# Patient Record
Sex: FEMALE | Race: WHITE | NOT HISPANIC OR LATINO | Employment: FULL TIME | URBAN - METROPOLITAN AREA
[De-identification: names, ages, dates, MRNs, and addresses within clinical notes are randomized per-mention and may not be internally consistent; named-entity substitution may affect disease eponyms.]

---

## 2017-01-05 ENCOUNTER — ALLSCRIPTS OFFICE VISIT (OUTPATIENT)
Dept: PERINATAL CARE | Facility: CLINIC | Age: 37
End: 2017-01-05
Payer: COMMERCIAL

## 2017-01-05 ENCOUNTER — ALLSCRIPTS OFFICE VISIT (OUTPATIENT)
Dept: OTHER | Facility: OTHER | Age: 37
End: 2017-01-05

## 2017-01-05 ENCOUNTER — GENERIC CONVERSION - ENCOUNTER (OUTPATIENT)
Dept: OTHER | Facility: OTHER | Age: 37
End: 2017-01-05

## 2017-01-05 LAB
GLUCOSE (HISTORICAL): NORMAL
PROT UR STRIP-MCNC: NORMAL MG/DL

## 2017-01-05 PROCEDURE — 76801 OB US < 14 WKS SINGLE FETUS: CPT | Performed by: OBSTETRICS & GYNECOLOGY

## 2017-01-05 PROCEDURE — 76813 OB US NUCHAL MEAS 1 GEST: CPT | Performed by: OBSTETRICS & GYNECOLOGY

## 2017-01-12 ENCOUNTER — GENERIC CONVERSION - ENCOUNTER (OUTPATIENT)
Dept: OTHER | Facility: OTHER | Age: 37
End: 2017-01-12

## 2017-01-18 ENCOUNTER — LAB CONVERSION - ENCOUNTER (OUTPATIENT)
Dept: OTHER | Facility: OTHER | Age: 37
End: 2017-01-18

## 2017-01-18 LAB
ABNORMAL MSS? (HISTORICAL): NORMAL
ABNORMAL US? (HISTORICAL): NORMAL
ADVANCED MATERNAL AGE? (HISTORICAL): YES
CHROMOSOME ANALYSIS (HISTORICAL): NORMAL
CHROMOSOME, BLOOD (HISTORICAL): DETECTED
CLINICAL HISTORY (HISTORICAL): NORMAL
COMMENTS: (HISTORICAL): NORMAL
FETAL FRACTION (HISTORICAL): NORMAL
GESTATIONAL AGE (HISTORICAL): 0
GESTATIONAL AGE (HISTORICAL): 14
INTERPRETATION (HISTORICAL): NORMAL
LIMITATIONS (HISTORICAL): NORMAL
METHODOLOGY (HISTORICAL): NORMAL
MICRODELETION (HISTORICAL): NOT DETECTED
MICRODELETION INTERPR. (HISTORICAL): NORMAL
NUMBER OF FETUSES (HISTORICAL): 1
SPECIFICATIONS (HISTORICAL): NORMAL
TRISOMY 13 (T13) (HISTORICAL): NEGATIVE
TRISOMY 18 (T18) (HISTORICAL): NEGATIVE
TRISOMY 21 (T21) (HISTORICAL): NEGATIVE

## 2017-01-19 ENCOUNTER — GENERIC CONVERSION - ENCOUNTER (OUTPATIENT)
Dept: OTHER | Facility: OTHER | Age: 37
End: 2017-01-19

## 2017-01-30 ENCOUNTER — APPOINTMENT (OUTPATIENT)
Dept: LAB | Age: 37
End: 2017-01-30
Payer: COMMERCIAL

## 2017-01-30 ENCOUNTER — ALLSCRIPTS OFFICE VISIT (OUTPATIENT)
Dept: OTHER | Facility: OTHER | Age: 37
End: 2017-01-30

## 2017-01-30 ENCOUNTER — TRANSCRIBE ORDERS (OUTPATIENT)
Dept: ADMINISTRATIVE | Age: 37
End: 2017-01-30

## 2017-01-30 DIAGNOSIS — Z36.9 ENCOUNTER FOR ANTENATAL SCREENING OF MOTHER: ICD-10-CM

## 2017-01-30 LAB
GLUCOSE (HISTORICAL): NORMAL
PROT UR STRIP-MCNC: NORMAL MG/DL

## 2017-01-30 PROCEDURE — 82105 ALPHA-FETOPROTEIN SERUM: CPT

## 2017-01-30 PROCEDURE — 36415 COLL VENOUS BLD VENIPUNCTURE: CPT

## 2017-02-01 LAB
2ND TRIMESTER 4 SCREEN SERPL-IMP: NORMAL
AFP ADJ MOM SERPL: 0.97
AFP INTERP AMN-IMP: NORMAL
AFP INTERP SERPL-IMP: NORMAL
AFP INTERP SERPL-IMP: NORMAL
AFP SERPL-MCNC: 32.5 NG/ML
AGE AT DELIVERY: 35.2 YEARS
GA METHOD: NORMAL
GA: 16.3 WEEKS
IDDM PATIENT QL: NO
Lab: NORMAL
MULTIPLE PREGNANCY: NO
NEURAL TUBE DEFECT RISK FETUS: NORMAL %

## 2017-02-23 ENCOUNTER — ALLSCRIPTS OFFICE VISIT (OUTPATIENT)
Dept: PERINATAL CARE | Facility: CLINIC | Age: 37
End: 2017-02-23
Payer: COMMERCIAL

## 2017-02-23 ENCOUNTER — GENERIC CONVERSION - ENCOUNTER (OUTPATIENT)
Dept: OTHER | Facility: OTHER | Age: 37
End: 2017-02-23

## 2017-02-23 PROCEDURE — 76811 OB US DETAILED SNGL FETUS: CPT | Performed by: OBSTETRICS & GYNECOLOGY

## 2017-02-23 PROCEDURE — 76817 TRANSVAGINAL US OBSTETRIC: CPT | Performed by: OBSTETRICS & GYNECOLOGY

## 2017-02-28 ENCOUNTER — ALLSCRIPTS OFFICE VISIT (OUTPATIENT)
Dept: OTHER | Facility: OTHER | Age: 37
End: 2017-02-28

## 2017-02-28 ENCOUNTER — GENERIC CONVERSION - ENCOUNTER (OUTPATIENT)
Dept: OTHER | Facility: OTHER | Age: 37
End: 2017-02-28

## 2017-03-31 ENCOUNTER — GENERIC CONVERSION - ENCOUNTER (OUTPATIENT)
Dept: OTHER | Facility: OTHER | Age: 37
End: 2017-03-31

## 2017-03-31 DIAGNOSIS — N39.0 URINARY TRACT INFECTION: ICD-10-CM

## 2017-03-31 DIAGNOSIS — Z36.9 ENCOUNTER FOR ANTENATAL SCREENING OF MOTHER: ICD-10-CM

## 2017-04-25 ENCOUNTER — APPOINTMENT (OUTPATIENT)
Dept: LAB | Age: 37
End: 2017-04-25
Payer: COMMERCIAL

## 2017-04-25 DIAGNOSIS — Z36.9 ENCOUNTER FOR ANTENATAL SCREENING OF MOTHER: ICD-10-CM

## 2017-04-25 LAB
BASOPHILS # BLD AUTO: 0.01 THOUSANDS/ΜL (ref 0–0.1)
BASOPHILS NFR BLD AUTO: 0 % (ref 0–1)
EOSINOPHIL # BLD AUTO: 0.13 THOUSAND/ΜL (ref 0–0.61)
EOSINOPHIL NFR BLD AUTO: 2 % (ref 0–6)
ERYTHROCYTE [DISTWIDTH] IN BLOOD BY AUTOMATED COUNT: 13.6 % (ref 11.6–15.1)
GLUCOSE 1H P 50 G GLC PO SERPL-MCNC: 123 MG/DL
HCT VFR BLD AUTO: 35.3 % (ref 34.8–46.1)
HGB BLD-MCNC: 12.1 G/DL (ref 11.5–15.4)
LYMPHOCYTES # BLD AUTO: 1.44 THOUSANDS/ΜL (ref 0.6–4.47)
LYMPHOCYTES NFR BLD AUTO: 16 % (ref 14–44)
MCH RBC QN AUTO: 30.6 PG (ref 26.8–34.3)
MCHC RBC AUTO-ENTMCNC: 34.3 G/DL (ref 31.4–37.4)
MCV RBC AUTO: 89 FL (ref 82–98)
MONOCYTES # BLD AUTO: 0.5 THOUSAND/ΜL (ref 0.17–1.22)
MONOCYTES NFR BLD AUTO: 6 % (ref 4–12)
NEUTROPHILS # BLD AUTO: 6.78 THOUSANDS/ΜL (ref 1.85–7.62)
NEUTS SEG NFR BLD AUTO: 76 % (ref 43–75)
NRBC BLD AUTO-RTO: 0 /100 WBCS
PLATELET # BLD AUTO: 172 THOUSANDS/UL (ref 149–390)
PMV BLD AUTO: 11.7 FL (ref 8.9–12.7)
RBC # BLD AUTO: 3.96 MILLION/UL (ref 3.81–5.12)
RPR SER QL: NORMAL
WBC # BLD AUTO: 8.92 THOUSAND/UL (ref 4.31–10.16)

## 2017-04-25 PROCEDURE — 36415 COLL VENOUS BLD VENIPUNCTURE: CPT

## 2017-04-25 PROCEDURE — 85025 COMPLETE CBC W/AUTO DIFF WBC: CPT

## 2017-04-25 PROCEDURE — 82950 GLUCOSE TEST: CPT

## 2017-04-25 PROCEDURE — 86592 SYPHILIS TEST NON-TREP QUAL: CPT

## 2017-04-28 ENCOUNTER — ALLSCRIPTS OFFICE VISIT (OUTPATIENT)
Dept: OTHER | Facility: OTHER | Age: 37
End: 2017-04-28

## 2017-04-28 ENCOUNTER — GENERIC CONVERSION - ENCOUNTER (OUTPATIENT)
Dept: OTHER | Facility: OTHER | Age: 37
End: 2017-04-28

## 2017-05-08 ENCOUNTER — TRANSCRIBE ORDERS (OUTPATIENT)
Dept: LAB | Age: 37
End: 2017-05-08

## 2017-05-08 ENCOUNTER — APPOINTMENT (OUTPATIENT)
Dept: LAB | Age: 37
End: 2017-05-08
Payer: COMMERCIAL

## 2017-05-08 DIAGNOSIS — N39.0 URINARY TRACT INFECTION: ICD-10-CM

## 2017-05-08 LAB
BILIRUB UR QL STRIP: NEGATIVE
CLARITY UR: CLEAR
COLOR UR: YELLOW
GLUCOSE UR STRIP-MCNC: NEGATIVE MG/DL
HGB UR QL STRIP.AUTO: NEGATIVE
KETONES UR STRIP-MCNC: NEGATIVE MG/DL
LEUKOCYTE ESTERASE UR QL STRIP: NEGATIVE
NITRITE UR QL STRIP: NEGATIVE
PH UR STRIP.AUTO: 6.5 [PH] (ref 4.5–8)
PROT UR STRIP-MCNC: NEGATIVE MG/DL
SP GR UR STRIP.AUTO: 1.02 (ref 1–1.03)
UROBILINOGEN UR QL STRIP.AUTO: 0.2 E.U./DL

## 2017-05-08 PROCEDURE — 81003 URINALYSIS AUTO W/O SCOPE: CPT

## 2017-05-12 ENCOUNTER — GENERIC CONVERSION - ENCOUNTER (OUTPATIENT)
Dept: OTHER | Facility: OTHER | Age: 37
End: 2017-05-12

## 2017-05-18 ENCOUNTER — GENERIC CONVERSION - ENCOUNTER (OUTPATIENT)
Dept: OTHER | Facility: OTHER | Age: 37
End: 2017-05-18

## 2017-05-18 ENCOUNTER — ALLSCRIPTS OFFICE VISIT (OUTPATIENT)
Dept: PERINATAL CARE | Facility: CLINIC | Age: 37
End: 2017-05-18
Payer: COMMERCIAL

## 2017-05-18 PROCEDURE — 76816 OB US FOLLOW-UP PER FETUS: CPT | Performed by: OBSTETRICS & GYNECOLOGY

## 2017-05-25 ENCOUNTER — GENERIC CONVERSION - ENCOUNTER (OUTPATIENT)
Dept: OTHER | Facility: OTHER | Age: 37
End: 2017-05-25

## 2017-06-08 ENCOUNTER — GENERIC CONVERSION - ENCOUNTER (OUTPATIENT)
Dept: OTHER | Facility: OTHER | Age: 37
End: 2017-06-08

## 2017-06-08 LAB — EXTERNAL GROUP B STREP ANTIGEN: NEGATIVE

## 2017-06-20 ENCOUNTER — GENERIC CONVERSION - ENCOUNTER (OUTPATIENT)
Dept: OTHER | Facility: OTHER | Age: 37
End: 2017-06-20

## 2017-06-29 ENCOUNTER — GENERIC CONVERSION - ENCOUNTER (OUTPATIENT)
Dept: OTHER | Facility: OTHER | Age: 37
End: 2017-06-29

## 2017-07-07 ENCOUNTER — GENERIC CONVERSION - ENCOUNTER (OUTPATIENT)
Dept: OTHER | Facility: OTHER | Age: 37
End: 2017-07-07

## 2017-07-13 ENCOUNTER — GENERIC CONVERSION - ENCOUNTER (OUTPATIENT)
Dept: OTHER | Facility: OTHER | Age: 37
End: 2017-07-13

## 2017-07-14 ENCOUNTER — APPOINTMENT (OUTPATIENT)
Dept: PERINATAL CARE | Facility: CLINIC | Age: 37
End: 2017-07-14
Payer: COMMERCIAL

## 2017-07-14 ENCOUNTER — ALLSCRIPTS OFFICE VISIT (OUTPATIENT)
Dept: PERINATAL CARE | Facility: CLINIC | Age: 37
End: 2017-07-14
Payer: COMMERCIAL

## 2017-07-14 ENCOUNTER — GENERIC CONVERSION - ENCOUNTER (OUTPATIENT)
Dept: OTHER | Facility: OTHER | Age: 37
End: 2017-07-14

## 2017-07-14 PROCEDURE — 76816 OB US FOLLOW-UP PER FETUS: CPT | Performed by: OBSTETRICS & GYNECOLOGY

## 2017-07-14 PROCEDURE — 59025 FETAL NON-STRESS TEST: CPT | Performed by: OBSTETRICS & GYNECOLOGY

## 2017-07-15 ENCOUNTER — ANESTHESIA EVENT (INPATIENT)
Dept: LABOR AND DELIVERY | Facility: HOSPITAL | Age: 37
End: 2017-07-15
Payer: COMMERCIAL

## 2017-07-15 ENCOUNTER — HOSPITAL ENCOUNTER (OUTPATIENT)
Facility: HOSPITAL | Age: 37
Discharge: HOME | End: 2017-07-15
Attending: OBSTETRICS & GYNECOLOGY | Admitting: OBSTETRICS & GYNECOLOGY
Payer: COMMERCIAL

## 2017-07-15 ENCOUNTER — ANESTHESIA (INPATIENT)
Dept: LABOR AND DELIVERY | Facility: HOSPITAL | Age: 37
End: 2017-07-15
Payer: COMMERCIAL

## 2017-07-15 ENCOUNTER — HOSPITAL ENCOUNTER (INPATIENT)
Facility: HOSPITAL | Age: 37
LOS: 3 days | Discharge: HOME | End: 2017-07-18
Attending: OBSTETRICS & GYNECOLOGY | Admitting: OBSTETRICS & GYNECOLOGY
Payer: COMMERCIAL

## 2017-07-15 VITALS
HEIGHT: 64 IN | WEIGHT: 185 LBS | OXYGEN SATURATION: 96 % | BODY MASS INDEX: 31.58 KG/M2 | RESPIRATION RATE: 18 BRPM | TEMPERATURE: 97.6 F | SYSTOLIC BLOOD PRESSURE: 124 MMHG | HEART RATE: 110 BPM | DIASTOLIC BLOOD PRESSURE: 82 MMHG

## 2017-07-15 DIAGNOSIS — Z98.891 S/P CESAREAN SECTION: ICD-10-CM

## 2017-07-15 DIAGNOSIS — Z3A.40 40 WEEKS GESTATION OF PREGNANCY: Primary | ICD-10-CM

## 2017-07-15 LAB
ABO GROUP BLD: NORMAL
BASE EXCESS BLDCOA CALC-SCNC: -0.2 MMOL/L (ref 3–11)
BASE EXCESS BLDCOV CALC-SCNC: 0.5 MMOL/L (ref 1–9)
BASOPHILS # BLD AUTO: 0.02 THOUSANDS/ΜL (ref 0–0.1)
BASOPHILS NFR BLD AUTO: 0 % (ref 0–1)
BLD GP AB SCN SERPL QL: NEGATIVE
EOSINOPHIL # BLD AUTO: 0.03 THOUSAND/ΜL (ref 0–0.61)
EOSINOPHIL NFR BLD AUTO: 0 % (ref 0–6)
ERYTHROCYTE [DISTWIDTH] IN BLOOD BY AUTOMATED COUNT: 13.7 % (ref 11.6–15.1)
HCO3 BLDCOA-SCNC: 25.7 MMOL/L (ref 17.3–27.3)
HCO3 BLDCOV-SCNC: 25.6 MMOL/L (ref 12.2–28.6)
HCT VFR BLD AUTO: 38.2 % (ref 34.8–46.1)
HGB BLD-MCNC: 13 G/DL (ref 11.5–15.4)
LYMPHOCYTES # BLD AUTO: 1.62 THOUSANDS/ΜL (ref 0.6–4.47)
LYMPHOCYTES NFR BLD AUTO: 9 % (ref 14–44)
MCH RBC QN AUTO: 29.8 PG (ref 26.8–34.3)
MCHC RBC AUTO-ENTMCNC: 34 G/DL (ref 31.4–37.4)
MCV RBC AUTO: 88 FL (ref 82–98)
MONOCYTES # BLD AUTO: 0.83 THOUSAND/ΜL (ref 0.17–1.22)
MONOCYTES NFR BLD AUTO: 4 % (ref 4–12)
NEUTROPHILS # BLD AUTO: 16.05 THOUSANDS/ΜL (ref 1.85–7.62)
NEUTS SEG NFR BLD AUTO: 87 % (ref 43–75)
NRBC BLD AUTO-RTO: 0 /100 WBCS
O2 CT VFR BLDCOA CALC: 3.6 ML/DL
OXYHGB MFR BLDCOA: 16.6 %
OXYHGB MFR BLDCOV: 45.4 %
PCO2 BLDCOA: 46.3 MM[HG] (ref 30–60)
PCO2 BLDCOV: 42.5 MM HG (ref 27–43)
PH BLDCOA: 7.36 [PH] (ref 7.23–7.43)
PH BLDCOV: 7.4 [PH] (ref 7.19–7.49)
PLATELET # BLD AUTO: 165 THOUSANDS/UL (ref 149–390)
PMV BLD AUTO: 12.8 FL (ref 8.9–12.7)
PO2 BLDCOA: 12.9 MM HG (ref 5–25)
PO2 BLDCOV: 21.8 MM HG (ref 15–45)
RBC # BLD AUTO: 4.36 MILLION/UL (ref 3.81–5.12)
RH BLD: POSITIVE
SAO2 % BLDCOV: 10.3 ML/DL
SPECIMEN EXPIRATION DATE: NORMAL
WBC # BLD AUTO: 18.68 THOUSAND/UL (ref 4.31–10.16)

## 2017-07-15 PROCEDURE — 99215 OFFICE O/P EST HI 40 MIN: CPT

## 2017-07-15 PROCEDURE — 86901 BLOOD TYPING SEROLOGIC RH(D): CPT | Performed by: OBSTETRICS & GYNECOLOGY

## 2017-07-15 PROCEDURE — 3E033VJ INTRODUCTION OF OTHER HORMONE INTO PERIPHERAL VEIN, PERCUTANEOUS APPROACH: ICD-10-PCS | Performed by: OBSTETRICS & GYNECOLOGY

## 2017-07-15 PROCEDURE — 85025 COMPLETE CBC W/AUTO DIFF WBC: CPT | Performed by: OBSTETRICS & GYNECOLOGY

## 2017-07-15 PROCEDURE — 86850 RBC ANTIBODY SCREEN: CPT | Performed by: OBSTETRICS & GYNECOLOGY

## 2017-07-15 PROCEDURE — 4A1HXCZ MONITORING OF PRODUCTS OF CONCEPTION, CARDIAC RATE, EXTERNAL APPROACH: ICD-10-PCS | Performed by: OBSTETRICS & GYNECOLOGY

## 2017-07-15 PROCEDURE — 86900 BLOOD TYPING SEROLOGIC ABO: CPT | Performed by: OBSTETRICS & GYNECOLOGY

## 2017-07-15 PROCEDURE — 82805 BLOOD GASES W/O2 SATURATION: CPT | Performed by: OBSTETRICS & GYNECOLOGY

## 2017-07-15 PROCEDURE — 86592 SYPHILIS TEST NON-TREP QUAL: CPT | Performed by: OBSTETRICS & GYNECOLOGY

## 2017-07-15 PROCEDURE — 10907ZC DRAINAGE OF AMNIOTIC FLUID, THERAPEUTIC FROM PRODUCTS OF CONCEPTION, VIA NATURAL OR ARTIFICIAL OPENING: ICD-10-PCS | Performed by: OBSTETRICS & GYNECOLOGY

## 2017-07-15 PROCEDURE — 99213 OFFICE O/P EST LOW 20 MIN: CPT

## 2017-07-15 RX ORDER — ROPIVACAINE HYDROCHLORIDE 2 MG/ML
INJECTION, SOLUTION EPIDURAL; INFILTRATION; PERINEURAL AS NEEDED
Status: DISCONTINUED | OUTPATIENT
Start: 2017-07-15 | End: 2017-07-15 | Stop reason: SURG

## 2017-07-15 RX ORDER — SODIUM CHLORIDE, SODIUM LACTATE, POTASSIUM CHLORIDE, CALCIUM CHLORIDE 600; 310; 30; 20 MG/100ML; MG/100ML; MG/100ML; MG/100ML
INJECTION, SOLUTION INTRAVENOUS CONTINUOUS PRN
Status: DISCONTINUED | OUTPATIENT
Start: 2017-07-15 | End: 2017-07-15 | Stop reason: SURG

## 2017-07-15 RX ORDER — ONDANSETRON 2 MG/ML
4 INJECTION INTRAMUSCULAR; INTRAVENOUS EVERY 6 HOURS PRN
Status: DISCONTINUED | OUTPATIENT
Start: 2017-07-15 | End: 2017-07-18 | Stop reason: HOSPADM

## 2017-07-15 RX ORDER — CEFAZOLIN SODIUM 1 G/3ML
INJECTION, POWDER, FOR SOLUTION INTRAMUSCULAR; INTRAVENOUS AS NEEDED
Status: DISCONTINUED | OUTPATIENT
Start: 2017-07-15 | End: 2017-07-15 | Stop reason: SURG

## 2017-07-15 RX ORDER — ONDANSETRON 2 MG/ML
INJECTION INTRAMUSCULAR; INTRAVENOUS AS NEEDED
Status: DISCONTINUED | OUTPATIENT
Start: 2017-07-15 | End: 2017-07-15 | Stop reason: SURG

## 2017-07-15 RX ORDER — FENTANYL CITRATE/PF 50 MCG/ML
25 SYRINGE (ML) INJECTION
Status: COMPLETED | OUTPATIENT
Start: 2017-07-15 | End: 2017-07-15

## 2017-07-15 RX ORDER — MORPHINE SULFATE 2 MG/ML
2 INJECTION, SOLUTION INTRAMUSCULAR; INTRAVENOUS
Status: DISCONTINUED | OUTPATIENT
Start: 2017-07-15 | End: 2017-07-17

## 2017-07-15 RX ORDER — LIDOCAINE HYDROCHLORIDE AND EPINEPHRINE 20; 5 MG/ML; UG/ML
INJECTION, SOLUTION EPIDURAL; INFILTRATION; INTRACAUDAL; PERINEURAL AS NEEDED
Status: DISCONTINUED | OUTPATIENT
Start: 2017-07-15 | End: 2017-07-15 | Stop reason: SURG

## 2017-07-15 RX ORDER — MORPHINE SULFATE 1 MG/ML
INJECTION, SOLUTION EPIDURAL; INTRATHECAL; INTRAVENOUS AS NEEDED
Status: DISCONTINUED | OUTPATIENT
Start: 2017-07-15 | End: 2017-07-15 | Stop reason: SURG

## 2017-07-15 RX ORDER — SODIUM CHLORIDE, SODIUM LACTATE, POTASSIUM CHLORIDE, CALCIUM CHLORIDE 600; 310; 30; 20 MG/100ML; MG/100ML; MG/100ML; MG/100ML
125 INJECTION, SOLUTION INTRAVENOUS CONTINUOUS
Status: DISCONTINUED | OUTPATIENT
Start: 2017-07-15 | End: 2017-07-16

## 2017-07-15 RX ORDER — OXYTOCIN/RINGER'S LACTATE 30/500 ML
1-30 PLASTIC BAG, INJECTION (ML) INTRAVENOUS
Status: DISCONTINUED | OUTPATIENT
Start: 2017-07-15 | End: 2017-07-17

## 2017-07-15 RX ORDER — ONDANSETRON 2 MG/ML
4 INJECTION INTRAMUSCULAR; INTRAVENOUS ONCE AS NEEDED
Status: DISCONTINUED | OUTPATIENT
Start: 2017-07-15 | End: 2017-07-18 | Stop reason: HOSPADM

## 2017-07-15 RX ADMIN — SODIUM CHLORIDE, SODIUM LACTATE, POTASSIUM CHLORIDE, AND CALCIUM CHLORIDE 125 ML/HR: .6; .31; .03; .02 INJECTION, SOLUTION INTRAVENOUS at 22:42

## 2017-07-15 RX ADMIN — SODIUM CHLORIDE, SODIUM LACTATE, POTASSIUM CHLORIDE, AND CALCIUM CHLORIDE 125 ML/HR: .6; .31; .03; .02 INJECTION, SOLUTION INTRAVENOUS at 15:20

## 2017-07-15 RX ADMIN — LIDOCAINE HYDROCHLORIDE AND EPINEPHRINE 5 ML: 20; 5 INJECTION, SOLUTION EPIDURAL; INFILTRATION; INTRACAUDAL; PERINEURAL at 21:02

## 2017-07-15 RX ADMIN — PHENYLEPHRINE HYDROCHLORIDE 50 MCG/MIN: 10 INJECTION INTRAVENOUS at 21:05

## 2017-07-15 RX ADMIN — AZITHROMYCIN MONOHYDRATE 500 MG: 500 INJECTION, POWDER, LYOPHILIZED, FOR SOLUTION INTRAVENOUS at 21:31

## 2017-07-15 RX ADMIN — ONDANSETRON 4 MG: 2 INJECTION INTRAMUSCULAR; INTRAVENOUS at 21:25

## 2017-07-15 RX ADMIN — LIDOCAINE HYDROCHLORIDE AND EPINEPHRINE 5 ML: 20; 5 INJECTION, SOLUTION EPIDURAL; INFILTRATION; INTRACAUDAL; PERINEURAL at 20:57

## 2017-07-15 RX ADMIN — DEXAMETHASONE SODIUM PHOSPHATE 10 MG: 10 INJECTION INTRAMUSCULAR; INTRAVENOUS at 21:47

## 2017-07-15 RX ADMIN — CEFAZOLIN 2000 MG: 1 INJECTION, POWDER, FOR SOLUTION INTRAVENOUS at 21:01

## 2017-07-15 RX ADMIN — SODIUM CHLORIDE, SODIUM LACTATE, POTASSIUM CHLORIDE, AND CALCIUM CHLORIDE: .6; .31; .03; .02 INJECTION, SOLUTION INTRAVENOUS at 20:42

## 2017-07-15 RX ADMIN — OXYTOCIN 65 MILLI-UNITS/MIN: 10 INJECTION, SOLUTION INTRAMUSCULAR; INTRAVENOUS at 21:15

## 2017-07-15 RX ADMIN — FENTANYL CITRATE 25 MCG: 50 INJECTION INTRAMUSCULAR; INTRAVENOUS at 22:27

## 2017-07-15 RX ADMIN — FENTANYL CITRATE 25 MCG: 50 INJECTION INTRAMUSCULAR; INTRAVENOUS at 23:20

## 2017-07-15 RX ADMIN — FENTANYL CITRATE 25 MCG: 50 INJECTION INTRAMUSCULAR; INTRAVENOUS at 22:35

## 2017-07-15 RX ADMIN — FENTANYL CITRATE 25 MCG: 50 INJECTION INTRAMUSCULAR; INTRAVENOUS at 23:37

## 2017-07-15 RX ADMIN — ROPIVACAINE HYDROCHLORIDE 5 ML: 2 INJECTION, SOLUTION EPIDURAL; INFILTRATION at 16:42

## 2017-07-15 RX ADMIN — SODIUM CHLORIDE, SODIUM LACTATE, POTASSIUM CHLORIDE, AND CALCIUM CHLORIDE 125 ML/HR: .6; .31; .03; .02 INJECTION, SOLUTION INTRAVENOUS at 16:15

## 2017-07-15 RX ADMIN — LIDOCAINE HYDROCHLORIDE AND EPINEPHRINE 5 ML: 20; 5 INJECTION, SOLUTION EPIDURAL; INFILTRATION; INTRACAUDAL; PERINEURAL at 20:59

## 2017-07-15 RX ADMIN — ONDANSETRON 4 MG: 2 INJECTION INTRAMUSCULAR; INTRAVENOUS at 17:01

## 2017-07-15 RX ADMIN — ROPIVACAINE HYDROCHLORIDE: 2 INJECTION, SOLUTION EPIDURAL; INFILTRATION at 17:22

## 2017-07-15 RX ADMIN — MORPHINE SULFATE 3 MG: 1 INJECTION, SOLUTION EPIDURAL; INTRATHECAL; INTRAVENOUS at 21:26

## 2017-07-15 RX ADMIN — SODIUM CHLORIDE, SODIUM LACTATE, POTASSIUM CHLORIDE, AND CALCIUM CHLORIDE 125 ML/HR: .6; .31; .03; .02 INJECTION, SOLUTION INTRAVENOUS at 19:13

## 2017-07-16 PROBLEM — Z98.891 S/P CESAREAN SECTION: Status: ACTIVE | Noted: 2017-07-16

## 2017-07-16 LAB
BASOPHILS # BLD MANUAL: 0 THOUSAND/UL (ref 0–0.1)
BASOPHILS NFR MAR MANUAL: 0 % (ref 0–1)
EOSINOPHIL # BLD MANUAL: 0 THOUSAND/UL (ref 0–0.4)
EOSINOPHIL NFR BLD MANUAL: 0 % (ref 0–6)
ERYTHROCYTE [DISTWIDTH] IN BLOOD BY AUTOMATED COUNT: 13.9 % (ref 11.6–15.1)
GIANT PLATELETS BLD QL SMEAR: PRESENT
HCT VFR BLD AUTO: 32.8 % (ref 34.8–46.1)
HGB BLD-MCNC: 11.3 G/DL (ref 11.5–15.4)
LYMPHOCYTES # BLD AUTO: 0.2 THOUSAND/UL (ref 0.6–4.47)
LYMPHOCYTES # BLD AUTO: 1 % (ref 14–44)
MCH RBC QN AUTO: 30.1 PG (ref 26.8–34.3)
MCHC RBC AUTO-ENTMCNC: 34.5 G/DL (ref 31.4–37.4)
MCV RBC AUTO: 87 FL (ref 82–98)
MONOCYTES # BLD AUTO: 0.59 THOUSAND/UL (ref 0–1.22)
MONOCYTES NFR BLD: 3 % (ref 4–12)
NEUTROPHILS # BLD MANUAL: 18.37 THOUSAND/UL (ref 1.85–7.62)
NEUTS SEG NFR BLD AUTO: 93 % (ref 43–75)
NRBC BLD AUTO-RTO: 0 /100 WBCS
PLATELET # BLD AUTO: 142 THOUSANDS/UL (ref 149–390)
PLATELET BLD QL SMEAR: ABNORMAL
PMV BLD AUTO: 12.4 FL (ref 8.9–12.7)
RBC # BLD AUTO: 3.76 MILLION/UL (ref 3.81–5.12)
RPR SER QL: NORMAL
VARIANT LYMPHS # BLD AUTO: 3 %
WBC # BLD AUTO: 19.75 THOUSAND/UL (ref 4.31–10.16)

## 2017-07-16 PROCEDURE — 85007 BL SMEAR W/DIFF WBC COUNT: CPT | Performed by: OBSTETRICS & GYNECOLOGY

## 2017-07-16 PROCEDURE — 85027 COMPLETE CBC AUTOMATED: CPT | Performed by: OBSTETRICS & GYNECOLOGY

## 2017-07-16 PROCEDURE — 94762 N-INVAS EAR/PLS OXIMTRY CONT: CPT

## 2017-07-16 RX ORDER — ACETAMINOPHEN 325 MG/1
650 TABLET ORAL EVERY 4 HOURS PRN
Status: DISCONTINUED | OUTPATIENT
Start: 2017-07-16 | End: 2017-07-18 | Stop reason: HOSPADM

## 2017-07-16 RX ORDER — OXYCODONE HYDROCHLORIDE AND ACETAMINOPHEN 5; 325 MG/1; MG/1
1 TABLET ORAL EVERY 4 HOURS PRN
Status: DISCONTINUED | OUTPATIENT
Start: 2017-07-17 | End: 2017-07-18 | Stop reason: HOSPADM

## 2017-07-16 RX ORDER — ONDANSETRON 2 MG/ML
4 INJECTION INTRAMUSCULAR; INTRAVENOUS EVERY 8 HOURS PRN
Status: DISCONTINUED | OUTPATIENT
Start: 2017-07-17 | End: 2017-07-18 | Stop reason: HOSPADM

## 2017-07-16 RX ORDER — SODIUM CHLORIDE, SODIUM LACTATE, POTASSIUM CHLORIDE, CALCIUM CHLORIDE 600; 310; 30; 20 MG/100ML; MG/100ML; MG/100ML; MG/100ML
125 INJECTION, SOLUTION INTRAVENOUS CONTINUOUS
Status: DISCONTINUED | OUTPATIENT
Start: 2017-07-16 | End: 2017-07-18 | Stop reason: HOSPADM

## 2017-07-16 RX ORDER — DIPHENHYDRAMINE HYDROCHLORIDE 50 MG/ML
25 INJECTION INTRAMUSCULAR; INTRAVENOUS EVERY 6 HOURS PRN
Status: DISCONTINUED | OUTPATIENT
Start: 2017-07-17 | End: 2017-07-18 | Stop reason: HOSPADM

## 2017-07-16 RX ORDER — KETOROLAC TROMETHAMINE 30 MG/ML
30 INJECTION, SOLUTION INTRAMUSCULAR; INTRAVENOUS EVERY 6 HOURS
Status: DISCONTINUED | OUTPATIENT
Start: 2017-07-16 | End: 2017-07-17

## 2017-07-16 RX ORDER — DOCUSATE SODIUM 100 MG/1
100 CAPSULE, LIQUID FILLED ORAL 2 TIMES DAILY
Status: DISCONTINUED | OUTPATIENT
Start: 2017-07-16 | End: 2017-07-18 | Stop reason: HOSPADM

## 2017-07-16 RX ORDER — IBUPROFEN 600 MG/1
600 TABLET ORAL EVERY 6 HOURS PRN
Status: DISCONTINUED | OUTPATIENT
Start: 2017-07-16 | End: 2017-07-18 | Stop reason: HOSPADM

## 2017-07-16 RX ORDER — DIPHENHYDRAMINE HYDROCHLORIDE 50 MG/ML
25 INJECTION INTRAMUSCULAR; INTRAVENOUS EVERY 6 HOURS PRN
Status: DISCONTINUED | OUTPATIENT
Start: 2017-07-16 | End: 2017-07-17

## 2017-07-16 RX ORDER — OXYCODONE HYDROCHLORIDE AND ACETAMINOPHEN 5; 325 MG/1; MG/1
2 TABLET ORAL EVERY 4 HOURS PRN
Status: DISCONTINUED | OUTPATIENT
Start: 2017-07-17 | End: 2017-07-18 | Stop reason: HOSPADM

## 2017-07-16 RX ORDER — METOCLOPRAMIDE HYDROCHLORIDE 5 MG/ML
5 INJECTION INTRAMUSCULAR; INTRAVENOUS EVERY 6 HOURS PRN
Status: DISCONTINUED | OUTPATIENT
Start: 2017-07-16 | End: 2017-07-17

## 2017-07-16 RX ORDER — KETOROLAC TROMETHAMINE 30 MG/ML
INJECTION, SOLUTION INTRAMUSCULAR; INTRAVENOUS
Status: COMPLETED
Start: 2017-07-16 | End: 2017-07-16

## 2017-07-16 RX ORDER — ONDANSETRON 2 MG/ML
4 INJECTION INTRAMUSCULAR; INTRAVENOUS EVERY 4 HOURS PRN
Status: DISCONTINUED | OUTPATIENT
Start: 2017-07-16 | End: 2017-07-17

## 2017-07-16 RX ORDER — SIMETHICONE 80 MG
80 TABLET,CHEWABLE ORAL 4 TIMES DAILY PRN
Status: DISCONTINUED | OUTPATIENT
Start: 2017-07-16 | End: 2017-07-18 | Stop reason: HOSPADM

## 2017-07-16 RX ADMIN — KETOROLAC TROMETHAMINE 30 MG: 30 INJECTION, SOLUTION INTRAMUSCULAR at 06:08

## 2017-07-16 RX ADMIN — KETOROLAC TROMETHAMINE 30 MG: 30 INJECTION, SOLUTION INTRAMUSCULAR at 23:29

## 2017-07-16 RX ADMIN — DOCUSATE SODIUM 100 MG: 100 CAPSULE, LIQUID FILLED ORAL at 09:39

## 2017-07-16 RX ADMIN — KETOROLAC TROMETHAMINE 30 MG: 30 INJECTION, SOLUTION INTRAMUSCULAR at 00:24

## 2017-07-16 RX ADMIN — KETOROLAC TROMETHAMINE 30 MG: 30 INJECTION, SOLUTION INTRAMUSCULAR at 18:20

## 2017-07-16 RX ADMIN — DIPHENHYDRAMINE HYDROCHLORIDE 25 MG: 50 INJECTION, SOLUTION INTRAMUSCULAR; INTRAVENOUS at 17:17

## 2017-07-16 RX ADMIN — TRIAMCINOLONE ACETONIDE: 1 OINTMENT TOPICAL at 21:16

## 2017-07-16 RX ADMIN — DOCUSATE SODIUM 100 MG: 100 CAPSULE, LIQUID FILLED ORAL at 18:20

## 2017-07-16 RX ADMIN — KETOROLAC TROMETHAMINE 30 MG: 30 INJECTION, SOLUTION INTRAMUSCULAR at 12:07

## 2017-07-17 RX ORDER — GUAIFENESIN/DEXTROMETHORPHAN 100-10MG/5
10 SYRUP ORAL EVERY 4 HOURS PRN
Status: DISCONTINUED | OUTPATIENT
Start: 2017-07-17 | End: 2017-07-18 | Stop reason: HOSPADM

## 2017-07-17 RX ADMIN — IBUPROFEN 600 MG: 600 TABLET, FILM COATED ORAL at 17:08

## 2017-07-17 RX ADMIN — GUAIFENESIN AND DEXTROMETHORPHAN 10 ML: 100; 10 SYRUP ORAL at 17:01

## 2017-07-17 RX ADMIN — OXYCODONE HYDROCHLORIDE AND ACETAMINOPHEN 2 TABLET: 5; 325 TABLET ORAL at 15:04

## 2017-07-17 RX ADMIN — OXYCODONE HYDROCHLORIDE AND ACETAMINOPHEN 1 TABLET: 5; 325 TABLET ORAL at 10:10

## 2017-07-17 RX ADMIN — DOCUSATE SODIUM 100 MG: 100 CAPSULE, LIQUID FILLED ORAL at 17:01

## 2017-07-17 RX ADMIN — OXYCODONE HYDROCHLORIDE AND ACETAMINOPHEN 2 TABLET: 5; 325 TABLET ORAL at 20:51

## 2017-07-17 RX ADMIN — KETOROLAC TROMETHAMINE 30 MG: 30 INJECTION, SOLUTION INTRAMUSCULAR at 06:06

## 2017-07-17 RX ADMIN — TRIAMCINOLONE ACETONIDE: 1 OINTMENT TOPICAL at 10:33

## 2017-07-17 RX ADMIN — DOCUSATE SODIUM 100 MG: 100 CAPSULE, LIQUID FILLED ORAL at 10:09

## 2017-07-17 RX ADMIN — TRIAMCINOLONE ACETONIDE: 1 OINTMENT TOPICAL at 17:01

## 2017-07-18 VITALS
SYSTOLIC BLOOD PRESSURE: 119 MMHG | HEART RATE: 87 BPM | DIASTOLIC BLOOD PRESSURE: 74 MMHG | RESPIRATION RATE: 18 BRPM | OXYGEN SATURATION: 99 % | TEMPERATURE: 97.8 F

## 2017-07-18 RX ADMIN — GUAIFENESIN AND DEXTROMETHORPHAN 10 ML: 100; 10 SYRUP ORAL at 09:16

## 2017-07-18 RX ADMIN — OXYCODONE HYDROCHLORIDE AND ACETAMINOPHEN 1 TABLET: 5; 325 TABLET ORAL at 09:16

## 2017-07-18 RX ADMIN — DOCUSATE SODIUM 100 MG: 100 CAPSULE, LIQUID FILLED ORAL at 09:11

## 2017-07-18 RX ADMIN — IBUPROFEN 600 MG: 600 TABLET, FILM COATED ORAL at 05:57

## 2017-07-18 RX ADMIN — OXYCODONE HYDROCHLORIDE AND ACETAMINOPHEN 2 TABLET: 5; 325 TABLET ORAL at 02:36

## 2017-07-18 RX ADMIN — GUAIFENESIN AND DEXTROMETHORPHAN 10 ML: 100; 10 SYRUP ORAL at 02:36

## 2017-07-18 RX ADMIN — TRIAMCINOLONE ACETONIDE: 1 OINTMENT TOPICAL at 09:17

## 2017-07-19 ENCOUNTER — GENERIC CONVERSION - ENCOUNTER (OUTPATIENT)
Dept: OTHER | Facility: OTHER | Age: 37
End: 2017-07-19

## 2017-07-21 ENCOUNTER — TELEPHONE (OUTPATIENT)
Dept: LABOR AND DELIVERY | Facility: HOSPITAL | Age: 37
End: 2017-07-21

## 2017-07-21 ENCOUNTER — GENERIC CONVERSION - ENCOUNTER (OUTPATIENT)
Dept: OTHER | Facility: OTHER | Age: 37
End: 2017-07-21

## 2017-07-24 ENCOUNTER — TELEPHONE (OUTPATIENT)
Dept: LABOR AND DELIVERY | Facility: HOSPITAL | Age: 37
End: 2017-07-24

## 2017-07-24 LAB — PLACENTA IN STORAGE: NORMAL

## 2017-08-07 ENCOUNTER — GENERIC CONVERSION - ENCOUNTER (OUTPATIENT)
Dept: OTHER | Facility: OTHER | Age: 37
End: 2017-08-07

## 2017-08-07 ENCOUNTER — ALLSCRIPTS OFFICE VISIT (OUTPATIENT)
Dept: OTHER | Facility: OTHER | Age: 37
End: 2017-08-07

## 2017-08-29 ENCOUNTER — GENERIC CONVERSION - ENCOUNTER (OUTPATIENT)
Dept: OTHER | Facility: OTHER | Age: 37
End: 2017-08-29

## 2018-01-10 NOTE — MISCELLANEOUS
Chief Complaint  Chief Complaint Free Text Note Form: ROGELIO: PT was admitted to [facility] from 07/15/2017 through 2017  Dx: 40 weeks gestation of pregnancy, Baby boy delivered through   Spoke with pt who reports she is doing well  Has no complaints or concerns at t his time  Pt is seeing OB this Friday and then again in 6 weeks for her post partum check up  History of Present Illness  TCM Communication [facility]: The patient is being contacted for follow-up after hospitalization and 2017  She was hospitalized at [facility]  The date of admission: 07/15/2017, date of discharge: 2017  Diagnosis: see note  She was discharged to home  Medications were not reviewed today  She did not schedule a follow up appointment  She refused a follow up appointment due to will follow up with OB for post partum check up  The patient is currently asymptomatic  Counseling was provided to the patient  Topics counseled included importance of compliance with treatment  Communication performed and completed by Jason Jacinto      Active Problems    1  ADD (attention deficit disorder) (314 00) (F98 8)   2  Breast feeding status of mother (V24 1) (Z39 1)   3  Elderly multigravida in third trimester (659 63) (O09 523)   4  Elevated cholesterol (272 0) (E78 00)   5  History of family problem (V61 8) (Z84 89)   6  Post-dates pregnancy (645 10) (O48 0)   7  Post-operative pain (338 18) (G89 18)    Past Medical History    1  History of  (637 90) (O03 9)   2  History of LGSIL on Pap smear (796 9)   3  History of Lumbar herniated disc (722 10) (M51 26)    Surgical History    1  History of Oral Surgery Tooth Extraction    Family History  Mother    1  Family history of Diverticulitis   2  Family history of colonic polyps (V18 51) (Z83 71)   3  Family history of hyperlipidemia (V18 19) (Z83 49)   4  Family history of hypertension (V17 49) (Z82 49)   5  Family history of hypothyroidism (V18 19) (Z83 49)   6  No pertinent family history  Father    9  Family history of    6  Family history of malignant neoplasm of prostate (V16 42) (Z80 42)   9  Family history of Idiopathic pulmonary fibrosis   10  Family history of Smoker  Maternal Grandmother    6  Family history of Colon cancer   12  Family history of diabetes mellitus (V18 0) (Z83 3)  Maternal Grandfather    15  Family history of myocardial infarction (V17 3) (Z82 49)  Family History    15  Family history of Colon cancer   15  Family history of macular degeneration (V19 19) (Z83 518)    Social History    · Daily caffeinated coffee consumption   · Never a smoker   · Never a smoker    Current Meds   1  Breast Pump Miscellaneous; USE AS DIRECTED; Therapy: 10GDV6970 to (Last Rx:14Rnk6671)  Requested for: 42Zop6344 Ordered   2  Ibuprofen 600 MG Oral Tablet; TAKE 1 TABLET 4 TIMES DAILY  WITH MEALS AS   NEEDED; Therapy: 79ARP8898 to (Last Rx:91Xdz4207) Ordered   3  Oxycodone-Acetaminophen 5-325 MG Oral Tablet; take 1 tablet every 4 to 6 hours as   needed for pain; Therapy: 72WTN9892 to (Evaluate:86Ktx8388); Last Rx:36Arw5762 Ordered   4  Prenatal Vitamin TABS; Therapy: (Recorded:77Alm6692) to Recorded   5  Vitamin D3 5000 UNIT Oral Capsule; Take one capsule daily; Therapy: 80Jyq3336 to (Evaluate:32Dlj9912) Recorded    Allergies    1  No Known Drug Allergies    2  No Known Environmental Allergies   3  No Known Food Allergies    Future Appointments    Date/Time Provider Specialty Site   2017 11:15 AM SARTHAK Gallegos   Obstetrics/Gynecology [facility]     Signatures   Electronically signed by : Shantel Moran MD; 2017  4:31PM EST                       (Author)

## 2018-01-11 ENCOUNTER — ALLSCRIPTS OFFICE VISIT (OUTPATIENT)
Dept: OTHER | Facility: OTHER | Age: 38
End: 2018-01-11

## 2018-01-12 NOTE — PROGRESS NOTES
MAY 18 2017         RE: Olga Bravo                                   To: [facility]   MR#: [id number]                                   [address]   : [address]: [id number]                             Fax: [phone]   (Exam #: [id number])      The LMP of this 30year old,  G2, P0-0-1-0 patient was OCT 7 2016, giving   her an ELISABETH of 2017 and a current gestational age of 34 weeks 6 days   by dates  A sonographic examination was performed on MAY 18 2017 using   real time equipment  The ultrasound examination was performed using   abdominal technique  The patient has a BMI of 30 6  Her blood pressure   today was 119/78  Earliest ultrasound found in her record: 16 9w0d 17 ELISABETH      Problem list:   1  Advanced maternal age with normal NIPT      Cardiac motion was observed at 147 bpm       INDICATIONS      advanced maternal age   Interval growth assesment   Evaluate missed anatomy      Exam Types      Level I      RESULTS      Fetus # 1 of 1   Vertex presentation   Fetal growth appeared normal   Placenta Location = Posterior, right lateral   No placenta previa   Placenta Grade = II      MEASUREMENTS (* Included In Average GA)      AC              27 1 cm        31 weeks 1 day * (36%)   BPD              8 5 cm        34 weeks 0 days* (79%)   HC              30 6 cm        33 weeks 5 days* (63%)   Femur            5 9 cm        30 weeks 5 days* (29%)      Humerus          5 2 cm        30 weeks 2 days  (25%)      Cerebellum       4 2 cm        34 weeks 2 days      HC/AC           1 13   FL/AC           0 22   FL/BPD          0 70   EFW (Ac/Fl/Hc)  1787 grams - 3 lbs 15 oz                 (39%)      THE AVERAGE GESTATIONAL AGE is 32 weeks 3 days +/- 18 days        AMNIOTIC FLUID      Q1: 6 8      Q2: 3 7      Q3: 3 5      Q4: 0 7   CLAY Total = 14 7 cm   Amniotic Fluid: Normal      ANATOMY DETAILS      Visualized Appearing Sonographically Normal:   HEAD: (Calvarium, BPD Level, Cavum, Lateral Ventricles, Choroid Plexus,   Cerebellum, Cisterna Magna);    TH  CAV  : (Lungs, Diaphragm); HEART:   ([employer], Proximal Right Outflow, 3VV, 3 Vessel Trachea, Short   Axis of Greater Vessels, Cardiac Axis, Cardiac Position);    STOMACH,   RIGHT KIDNEY, LEFT KIDNEY, PLACENTA, PCI      ANATOMY COMMENTS      The prior fetal anatomic survey was limited the area of the PCI  These   anatomic views were seen today as sonographically normal within the   inherent limitations of fetal ultrasound  This completes the anatomical   survey  IMPRESSION      [facility] IUP   32 weeks and 3 days by this ultrasound  (ELISABETH=JUL 10 2017)   Vertex presentation   Fetal growth appeared normal   Regular fetal heart rate of 147 bpm   Posterior, right lateral placenta   No placenta previa      GENERAL COMMENT      The patient has no complaints today  She reports regular fetal movements   and denies problems related to hypertension, gestational diabetes,    labor, or vaginal bleeding  Her prenatal course has apparently been   unremarkable in the interim since her most recent visit here  RECOMMENDATION      Growth Ultrasound: As indicated      BRAD Muller M D     Maternal-Fetal Medicine   Electronically signed 17 20:36

## 2018-01-12 NOTE — RESULT NOTES
Verified Results  (Q) QNATAL (TM) ADVANCED [id number] 10:12AM Montse Shown   REPORT COMMENT:  FASTING:NO     Test Name Result Flag Reference   NUMBER OF FETUSES 1     ADVANCED MATERNAL AGE? YES     ABNORMAL LEXI? NOT GIVEN     ABNORMAL US? NOT GIVEN     PERSONAL/FAM HISTORY? NOT GIVEN     INTERPRETATION SEE NOTE     This specimen showed expected representation of chromosome  21, 18, and 13 material    TRISOMY 21 (T21) Negative     TRISOMY 18 (T18) Negative     TRISOMY 13 (T13) Negative     Y CHROMOSOME Detected     Y CHR  INTERPRETATION SEE NOTE     Consistent with a male fetus  SEX CHROMOSOME No aneuploidy     SEX CHROMOSOME INTERP SEE NOTE     No apparent abnormality was detected  See "Limitations"  below  MICRODELETION Not detected     MICRODELETION INTERP SEE NOTE     No apparent abnormality was detected  See "Limitations"  below  GESTATION AGE (IN WEEKS) 14     GESTATIONAL AGE (IN DAYS) 0     FETAL FRACTION 8 20%     LABORATORY COMMENTS SEE NOTE     Laboratory results and submitted clinical information  reviewed by Latesha Randall MD, A, [facility]  LIMITATIONS SEE NOTE     This test has been validated on women with a aguero  pregnancy that is >=10 weeks gestational age  As such, the  accuracy of this test for specimens drawn at less than 10  weeks gestation is unknown  In addition, there are limited  data available for the performance of this test in multiple  gestation pregnancies and for the detection of  microdeletions  Specimens are analyzed for aneuploidies  involving chromosomes 21, 18, 13, X, and Y, and  microdeletions of the specified regions only  The Y  chromosome is analyzed for the determination of fetal sex,  and the sensitivity and specificity of this analysis may be  less than that of the autosome analysis  Sex chromosome  aneuploidy analysis is not performed for multiple gestation  pregnancies   Aneuploidies involving chromosomes other than  those specified above or abnormalities involving chromosomal  regions other than those specified are not included in this  testing  While the results of this test are highly accurate,  not all of the chromosome abnormalities interrogated may be  detected due to maternal, placental, or fetal mosaicism, or  other unexplained causes  The accuracy of the test results  may also be affected by the presence of chromosome  abnormalities or copy number variations that are maternal in  origin, or by vanishing twin syndrome in a multiple  gestation pregnancy  Circulating cell-free fetal DNA  screening does not replace the precision of diagnosis using  chorionic villus sampling or amniocentesis  It does not  assess the risk of fetal anomalies such as neural tube  defects or ventral wall defects, and should not be  considered in isolation from other clinical findings and  laboratory test results  Management decisions, including  pregnancy termination, should not be based solely on the  results of cell-free DNA screening  A negative test result  does not ensure an unaffected pregnancy  The healthcare  provider is responsible for the use of this information in  the management of his/her patient  Health care providers, please contact your local [place] genetic counselor or call [email or web address] Client  Services at [email or web address] ([phone]) for assistance with  interpretation of these results  SPECIFICATIONS SEE NOTE     Sensitivity         Specificity  T21        >99 9%               >99 9%     T18        >99 9%               >99 9%     T13        >99 9%               >99 9%               Accuracy  Y          >99 9%     Performance of the [employer] laboratory-developed test  (LDT) has been determined based on internal analytical  assessment  METHODOLOGY SEE NOTE     Circulating cell-free (cf) DNA was isolated from plasma  It  was then detected on a massively parallel sequencing  platform   Bioinformatic analysis was performed to determine  the representation of fetal DNA in the specimen, especially  fetal material from chromosomes 21, 18, and 13  The  representation of other fetal material, including the sex  chromosomes (X and Y) and select chromosomal regions (22q,  15q, 11q, 8q, 5p, 4p 1p), was also evaluated and will only  be reported as "Additional Chromosome Results" when an  abnormality is detected  This test was developed and its  performance characteristics have been determined by [facility], [address] [place], [place] [address]  It has  not been cleared or approved by the [place]  Food and Drug  Administration  The FDA has determined that such clearance  or approval is not necessary  Performance characteristics  refer to the analytical performance of the test  This test  is performed pursuant to a license agreement with [employer]  This test was developed and its analytical performance  characteristics have been determined by [facility]  It has not been  cleared or approved by FDA  This assay has been validated  pursuant to the CLIA regulations and is used for clinical  purposes

## 2018-01-12 NOTE — PROGRESS NOTES
2017         RE: Dena Hawkins                                   To: [facility]   MR#: [id number]                                   [address] [place] [address]   : [address]: [phone]                             Fax: [phone]   (Exam #: [id number])      The LMP of this 31year old,  G2, P0-0-1-0 patient was OCT 7 2016, giving   her an ELISABETH of 2017 and a current gestational age of 24 weeks 6 days   by dates  A sonographic examination was performed on 2017 using   real time equipment  The ultrasound examination was performed using   abdominal & vaginal techniques  The patient has a BMI of 26 8  Her blood   pressure today was 128/83  Earliest ultrasound found in her record: 16 9w0d 17 ELISABETH      Buffy Jacobsen has no complaints today  She reports some fetal movement and denies   vaginal bleeding  Cell free DNA analysis earlier in the pregnancy revealed   negative results  Recent MSAFP screening revealed a normal value of 0 97   MoM        Cardiac motion was observed at 160 bpm       INDICATIONS      advanced maternal age      Exam Types      LEVEL II   Transvaginal      RESULTS      Fetus # 1 of 1   Vertex presentation   Fetal growth appeared normal   Placenta Location = Posterior   No placenta previa   Placenta Grade = I      MEASUREMENTS (* Included In Average GA)      AC              14 3 cm        19 weeks 3 days* (41%)   BPD              4 8 cm        20 weeks 3 days* (64%)   HC              17 6 cm        20 weeks 0 days* (50%)   Femur            3 1 cm        19 weeks 6 days* (39%)      Nuchal Fold      3 3 mm   NBL              7 2 mm      Humerus          2 9 cm        19 weeks 4 days  (46%)      Cerebellum       2 0 cm        20 weeks 1 day   Biorbit          3 1 cm        19 weeks 6 days   CisternaMagna    6 6 mm      HC/AC           1 23   FL/AC           0 22   FL/BPD          0 66   EFW (Ac/Fl/Hc)   309 grams - 0 lbs 11 oz      THE AVERAGE GESTATIONAL AGE is 19 weeks 6 days +/- 10 days  AMNIOTIC FLUID         Largest Vertical Pocket = 4 9 cm   Amniotic Fluid: Normal      CERVICAL EVALUATION      The cervix appeared normal (Ultrasound Examination)  SUPINE      Cervical Length: 3 20 cm      OTHER TEST RESULTS           Funneling?: No             Dynamic Changes?: No        Resp  To TFP?: No      ANATOMY      Head                                    Normal   Face/Neck                               Normal   Th  Cav  Normal   Heart                                   Normal   Abd  Cav  Normal   Stomach                                 Normal   Right Kidney                            Normal   Left Kidney                             Normal   Bladder                                 Normal   Abd  Wall                               Normal   Spine                                   Normal   Extrems                                 Normal   Genitalia                               Normal   Placenta                                Normal   Umbl  Cord                              Normal   Uterus                                  Normal   PCI                                     Not Visualized      ANATOMY DETAILS      Visualized Appearing Sonographically Normal:   HEAD: (Calvarium, BPD Level, Cavum, Lateral Ventricles, Choroid Plexus,   Cerebellum, Cisterna Magna);    FACE/NECK: (Neck, Nuchal Fold, Profile,   Orbits, Nose/Lips, Palate, Face);    TH  CAV  : (Lungs, Diaphragm); HEART: (Four [employer], Proximal Left Outflow, Proximal Right Outflow,   3VV, 3 Vessel Trachea, Short Axis of Greater Vessels, Ductal Arch, Aortic   Arch, Interventricular Septum, Interatrial Septum, IVC, SVC, Cardiac Axis,   Cardiac Position);    ABD  CAV : (Liver);    STOMACH, RIGHT KIDNEY, LEFT   KIDNEY, BLADDER, ABD   WALL, SPINE: (Cervical Spine, Thoracic Spine, Lumbar   Spine, Sacrum); EXTREMS: (Lt Humerus, Rt Humerus, Lt Forearm, Rt   Forearm, Lt Hand, Rt Hand, Lt Femur, Rt Femur, Lt Low Leg, Rt Low Leg, Lt   Foot, Rt Foot);    GENITALIA (Male), PLACENTA, UMBL  CORD, UTERUS      Not Visualized:   PCI      ADNEXA      The left ovary appeared normal and measured 2 8 x 2 4 x 1 4 cm with a   volume of 4 9 cc  The right ovary appeared normal and measured 2 4 x 2 3 x   1 6 cm with a volume of 4 6 cc  IMPRESSION      [facility] IUP   19 weeks and 6 days by this ultrasound  (ELISABETH=JUL 14 2017)   Vertex presentation   Fetal growth appeared normal   Regular fetal heart rate of 160 bpm   Posterior placenta   No placenta previa      GENERAL COMMENT      No fetal structural abnormality or ultrasound marker for aneuploidy is   identified on the Level II ultrasound study today  The placental cord   insertion site is suboptimally imaged secondary to unfavorable fetal   position  Fetal growth and amniotic fluid volume are normal   The   placenta is normal in appearance  The cervix is normal in appearance by transvaginal sonography  The   cervical length is normal   Cervical debris is not present  Cervical   funneling is not present  Neither provocative nor dynamic change is   appreciated  Today's ultrasound findings and suggested follow-up were discussed in   detail with Kehinde  We discussed that prenatal ultrasound cannot rule out   all congenital abnormalities  Her non invasive prenatal testing and MSAFP   results were discussed in detail  Miguelito Bailey will [facility] to the [facility] at 32 weeks gestation to assess interval growth for the indication   of advanced maternal age  The face to face time, in addition to time spent discussing ultrasound   results, was approximately 10 minutes, greater than 50% of which was spent   during counseling and coordination of care  Fredick Bernhardt, R D M S Percell Closs, M D     Maternal-Fetal Medicine   Electronically signed 02/23/17 19:44

## 2018-01-12 NOTE — CONSULTS
Assessment   1  Family history of colon cancer (V16 0) (Z80 0)   2  Bright red rectal bleeding (569 3) (K62 5)   3  Chronic constipation (564 00) (K59 09)    Plan   Bright red rectal bleeding, Family history of colon cancer    · COLONOSCOPY (GI, SURG); Status:Active; Requested DPP:[id number]; Perform:[facility]; OSZ:[id number]; Last Updated Renate Eastman; 1/11/2018 9:19:28 AM;Ordered; For:Bright red rectal bleeding, Family history of colon cancer; Ordered By:Zohaib Chase; History of family problem    · Suprep Bowel Prep Kit 17 5-3 13-1 6 GM/180ML Oral Solution; USE AS DIRECTED   Rx By: Kyle Bobo; Dispense: 0 Days ; #:1 ML; Refill: 0;For: History of family problem; NIMA = N; Verified Transmission to [address]; Last Updated By: [employer]; 1/11/2018 9:07:25 AM    Discussion/Summary   Discussion Summary:    Rectal bleeding is likely hemorrhoidal in nature  Discussed increasing fiber with Metamucil, increasing fluid to 460 cups a day, and trial of MiraLax / Dulcolax  We discussed that she could do that she try Linzess once he stops nursing  I would also like her to try Linzess 3 4 days prior to the colonoscopy to make sure her preparation is adequate  No risk for pelvic floor dysfunction without any history of episiotomy  Did discuss using a stool underneath her foot for improvement of bowel movements  Will not plan for any manometry workup at this time  Further management will be based on how she responds to [address]  Will start with 145 mcg samples are given  May need to call this in if patient does like this  This could be up titrate to 290 if patient likes 145 mcg but not efficacy enough  Due to extensive history of family history of colon cancer with the family member being diagnosed at the age of 36 will plan for colonoscopy now  Discussed the procedure        Chief Complaint   Chief Complaint Free Text Note Form: New patient complains of bright blood in stool, constipation, family history of colon cancer, history of hemorrhoids      History of Present Illness   HPI: 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 presents here for evaluation of hemorrhoidal type bleeding  further questioning she has extensive history of colorectal cancer with multiple second-degree family members with colorectal cancer  Some of them were diagnosed at the age of 36  she has no prior colonoscopy  She also has longstanding constipation  She has previously tried MiraLax with some efficacy  Currently moves her bowels every several days associated with straining and discomfort  Associated with constipation she also has rectal bleeding which has subsided in the last several days  She may not be getting enough fiber and fluid which may be causing her constipation  She has tried MiraLax but has not been very efficacy  Currently denies any other discomfort  She is worried about colorectal cancer especially due to extensive history  She recently did have a childbirth but had this via   She is currently nursing but plans to stop in about a month  Review of Systems   Complete-Female GI Adult:      Constitutional: No fever, no chills, feels well, no tiredness, no recent weight gain or weight loss  Eyes: No complaints of eye pain, no red eyes, no eyesight problems, no discharge, no dry eyes, no itching of eyes  ENT: no complaints of earache, no loss of hearing, no nose bleeds, no nasal discharge, no sore throat, no hoarseness  Cardiovascular: No complaints of slow heart rate, no fast heart rate, no chest pain, no palpitations, no leg claudication, no lower extremity edema  Respiratory: No complaints of shortness of breath, no wheezing, no cough, no SOB on exertion, no orthopnea, no PND        Gastrointestinal: constipation,-- bloody stools-- and-- change in bowel habits, but-- No complaints of abdominal pain, no constipation, no nausea or vomiting, no diarrhea, no bloody stools  Genitourinary: No complaints of dysuria, no incontinence, no pelvic pain, no dysmenorrhea, no vaginal discharge or bleeding  Musculoskeletal: No complaints of arthralgias, no myalgias, no joint swelling or stiffness, no limb pain or swelling  Integumentary: No complaints of skin rash or lesions, no itching, no skin wounds, no breast pain or lump  Neurological: No complaints of headache, no confusion, no convulsions, no numbness, no dizziness or fainting, no tingling, no limb weakness, no difficulty walking  Psychiatric: Not suicidal, no sleep disturbance, no anxiety or depression, no change in personality, no emotional problems  Endocrine: No complaints of proptosis, no hot flashes, no muscle weakness, no deepening of the voice, no feelings of weakness  Hematologic/Lymphatic: No complaints of swollen glands, no swollen glands in the neck, does not bleed easily, does not bruise easily  ROS Reviewed:    ROS reviewed  Active Problems   1  ADD (attention deficit disorder) (314 00) (F98 8)   2  Breast feeding status of mother (V24 1) (Z39 1)   3  Elderly multigravida in third trimester (659 63) (O09 523)   4  Elevated cholesterol (272 0) (E78 00)   5  History of family problem (V61 8) (Z84 89)   6  Post-dates pregnancy (645 10) (O48 0)   7  Post-operative pain (338 18) (G89 18)    Past Medical History   1  History of  (637 90) (O03 9)   2  History of LGSIL on Pap smear (796 9)   3  History of Lumbar herniated disc (722 10) (M51 26)  Active Problems And Past Medical History Reviewed: The active problems and past medical history were reviewed and updated today  Surgical History   1  History of Oral Surgery Tooth Extraction  Surgical History Reviewed: The surgical history was reviewed and updated today  Family History   Mother    1  Family history of Diverticulitis   2  Family history of colonic polyps (V18 51) (Z83 71)   3  Family history of hyperlipidemia (V18 19) (Z83 49)   4  Family history of hypertension (V17 49) (Z82 49)   5  Family history of hypothyroidism (V18 19) (Z83 49)  Father    10  Family history of    7  Family history of malignant neoplasm of prostate (V16 42) (Z80 42)   8  Family history of Idiopathic pulmonary fibrosis   9  Family history of Smoker  Maternal Grandmother    10  Family history of Colon cancer   11  Family history of diabetes mellitus (V18 0) (Z83 3)  Maternal Grandfather    12  Family history of myocardial infarction (V17 3) (Z82 49)  Family History    13  Family history of Colon cancer   15  Family history of macular degeneration (V19 19) (O56 861)  Family History Reviewed: The family history was reviewed and updated today  Social History    · Daily caffeinated coffee consumption   · Never a smoker   · Never a smoker  Social History Reviewed: The social history was reviewed and updated today  Current Meds    1  Prenatal Vitamin TABS; Therapy: (Recorded:38Bzx8162) to Recorded   2  Vitamin D3 5000 UNIT Oral Capsule; Take one capsule daily; Therapy: 13Yot1307 to (Evaluate:86Otq2482) Recorded  Medication List Reviewed: The medication list was reviewed and updated today  Allergies   1  No Known Drug Allergies  2  No Known Environmental Allergies   3  No Known Food Allergies    Vitals   Vital Signs    Recorded: 04PWM3069 08:36AM   Temperature 94 7 F, Tympanic   Heart Rate 90   Respiration 16   Systolic 327, LUE, Sitting   Diastolic 76, LUE, Sitting   Height 5 ft 4 in   Weight 146 lb 6 4 oz   BMI Calculated 25 13   BSA Calculated 1 71   O2 Saturation 98     Physical Exam        Constitutional      General appearance: No acute distress, well appearing and well nourished  Eyes      Conjunctiva and lids: No swelling, erythema or discharge         Ears, Nose, Mouth, and Throat      External inspection of ears and nose: Normal        Oropharynx: Normal with no erythema, edema, exudate or lesions  Pulmonary      Respiratory effort: No increased work of breathing or signs of respiratory distress  Auscultation of lungs: Clear to auscultation  Cardiovascular      Palpation of heart: Normal PMI, no thrills  Auscultation of heart: Normal rate and rhythm, normal S1 and S2, without murmurs  Examination of extremities for edema and/or varicosities: Normal        Abdomen      Abdomen: Non-tender, no masses  Liver and spleen: No hepatomegaly or splenomegaly  Musculoskeletal      Gait and station: Normal        Skin      Skin and subcutaneous tissue: Normal without rashes or lesions  Neurologic      Cranial nerves: Cranial nerves 2-12 intact  Psychiatric      Orientation to person, place, and time: Normal           Future Appointments      Date/Time Provider Specialty Site   02/26/2018 10:15 AM Nestor Caba MD Gastroenterology Adult [facility]OSCOPY   02/23/2018 01:00 PM SARTHAK Oliva   Obstetrics/Gynecology [facility]     Signatures    Electronically signed by : Petros Pacheco MD; Jan 11 2018  9:38AM EST                       (Author)

## 2018-01-13 VITALS
HEIGHT: 64 IN | SYSTOLIC BLOOD PRESSURE: 134 MMHG | BODY MASS INDEX: 24.59 KG/M2 | WEIGHT: 144 LBS | DIASTOLIC BLOOD PRESSURE: 80 MMHG

## 2018-01-13 VITALS
HEIGHT: 64 IN | BODY MASS INDEX: 26.63 KG/M2 | DIASTOLIC BLOOD PRESSURE: 83 MMHG | WEIGHT: 156 LBS | SYSTOLIC BLOOD PRESSURE: 128 MMHG

## 2018-01-13 NOTE — PROGRESS NOTES
2017         RE: Rosanna Levin                                   To: [facility]   MR#: [id number]                                   Igor Puente   : [address]: [id number]                             Fax: [phone]   (Exam #: [id number])      The LMP of this 30year old,  G2, P0-0-1-0 patient was OCT 7 2016, giving   her an ELISABETH of 2017 and a current gestational age of 43 weeks 0 days   by dates  A sonographic examination was performed on 2017 using   real time equipment  The ultrasound examination was performed using   abdominal technique  The patient has a BMI of 32 3  Her blood pressure   today was 132/79  Earliest ultrasound found in her record: 16 9w0d 17 ELISABETH      Problem list:   1  Advanced maternal age with normal NIPT      Cardiac motion was observed at 150 bpm       INDICATIONS      post dates      Exam Types      Level I      RESULTS      Fetus # 1 of 1   Vertex presentation   Fetal growth appeared normal   Placenta Location = Posterior, right lateral   No placenta previa   Placenta Grade = III      The NST was reactive with no decelerations  MEASUREMENTS (* Included In Average GA)      AC              36 6 cm        40 weeks 6 days* (72%)   BPD              9 2 cm        37 weeks 2 days* (34%)   HC              33 5 cm        37 weeks 5 days* (25%)   Femur            7 5 cm        37 weeks 6 days* (36%)      Cerebellum       5 6 cm        36 weeks 3 days      HC/AC           0 91   FL/AC           0 21   FL/BPD          0 82   EFW (Ac/Fl/Hc)  3790 grams - 8 lbs 6 oz                 (73%)      THE AVERAGE GESTATIONAL AGE is 38 weeks 3 days +/- 21 days        AMNIOTIC FLUID      Q1: 4 5      Q2: 3 7      Q3: 1 1      Q4: 3 8   CLAY Total = 13 1 cm   Amniotic Fluid: Normal      ANATOMY DETAILS      Visualized Appearing Sonographically Normal:   HEAD: (Calvarium, BPD Level, Lateral Ventricles);    TH  CAV :   (Diaphragm); HEART: ([email or web address], Cardiac Axis, Cardiac   Position);    STOMACH, RIGHT KIDNEY, LEFT KIDNEY, BLADDER, PLACENTA      BIOPHYSICAL PROFILE      The Biophysical Profile score was 10/10  Breathin  Movement: 2  Tone: 2  AFV: 2  NST: 2   The NST was reactive with no decelerations  IMPRESSION      [place] IUP   38 weeks and 3 days by this ultrasound  (ELISABETH=2017)   Vertex presentation   Fetal growth appeared normal   Regular fetal heart rate of 150 bpm   Posterior, right lateral placenta   No placenta previa      GENERAL COMMENT       Her induction is set up for    She'll have her next and last NST on     BRAD Mims M D     Maternal-Fetal Medicine   Electronically signed 07/15/17 20:17

## 2018-01-13 NOTE — RESULT NOTES
Message   Can you please let patient know that her ultrasound of the groin as well as pelvic ultrasound was normal    If she wants, we can proceed with a CT of the pelvis for further investigation   Thank you     Verified Results  US GROIN [id number] 07:16AM Manisha Finn     Test Name Result Flag Reference   US GROIN (Report)     RIGHT GROIN ULTRASOUND     INDICATION: Right lower quadrant pain into the groin for 2 months, pain worse when lying prone  COMPARISON: None     TECHNIQUE:  Real-time ultrasound of the right groin was performed with a linear transducer with both volumetric sweeps and still imaging techniques  FINDINGS:    No solid or cystic masses are identified  No bowel containing hernia seen  The study was not specifically tailored to evaluate the regional vasculature  Major vessels appear grossly patent on color imaging however  IMPRESSION:     Unremarkable study  Workstation performed: [id number]     Signed by:   Wing Mahendra DO   9/8/16     * US PELVIS COMPLETE (TRANSABDOMINAL AND TRANSVAGINAL) [id number] 07:15AM Manisha Finn     Test Name Result Flag Reference   US PELVIS COMPLETE (TRANSABDOMINAL AND TRANSVAGINAL) (Report)     PELVIC ULTRASOUND, COMPLETE     INDICATION: Right lower quadrant pain  COMPARISON: None  TECHNIQUE:  Transabdominal pelvic ultrasound was performed in sagittal and transverse planes with a curvilinear transducer  Additional transvaginal imaging was performed to better evaluate the endometrium and ovaries  Imaging included volumetric    sweeps as well as traditional still imaging technique  FINDINGS:     UTERUS:   The uterus is anteverted in position, measuring 6 8 x 3 4 x 4 0 cm  Contour and echotexture appear normal      The cervix shows no suspicious abnormality  ENDOMETRIUM:    Normal caliber of 9 mm  Homogenous and normal in appearance  OVARIES/ADNEXA:   Right ovary: 2 6 x 2 8 x 2 1 cm      No suspicious right ovarian abnormality  Doppler flow within normal limits  Left ovary: 2 3 x 2 3 x 1 6 cm  No suspicious left ovarian abnormality  Doppler flow within normal limits  No suspicious adnexal mass or loculated collections  There is no free fluid  IMPRESSION:      Normal           Workstation performed: [id number]     Signed by:    Lupe Ruiz MD   9/8/16

## 2018-01-13 NOTE — MISCELLANEOUS
Message  patient called left message that [facility] notified her NIPT was approved yesterday  Phone call to Gracie Gaviria [phone] @ [facility] patient approval [id number] letter faxed to [facility] office yesterday, patient blood draw approved 17 through 17, may use any [facility] lab  Patient in office and lab slip given with list of network outpatient [facility] sites  Patient aware insurance authorization obtained by [facility] office does not mean test has no charge, test results will be available in 7-10 business days  Active Problems    1  ADD (attention deficit disorder) (314 00) (F98 8)   2   screening encounter (V28 9) (Z36)   3  Elderly multigravida in first trimester (659 63) (O09 521)   4  Elevated cholesterol (272 0) (E78 00)   5  Encounter for gynecological examination with Papanicolaou smear of cervix   (V72 31,V76 2) (Z01 419,Z12 4)   6  Encounter for initial prescription of contraceptives (V25 02) (Z30 019)   7  Encounter for routine gynecological examination (V72 31) (Z01 419)   8  Encounter to establish care (V65 8) (Z76 89)   9  Fatigue (780 79) (R53 83)   10  History of family problem (V61 8) (Z84 89)   11  Need for prophylactic vaccination and inoculation against influenza (V04 81) (Z23)   12  Nipple discharge in female (611 79) (N64 52)   13  Oral contraceptive use (V25 41) (Z30 41)   14  Pregnancy (V22 2) (Z33 1)   15  Right groin pain (789 09) (R10 30)   16  RLQ discomfort (789 03) (R10 31)    Current Meds   1  Prenatal Vitamin TABS; Therapy: (Recorded:17Elc4237) to Recorded   2  Vitamin D3 5000 UNIT Oral Capsule; Take one capsule daily; Therapy: 76Uxg7337 to (Evaluate:66Ghf6611) Recorded    Allergies    1   No Known Drug Allergies    Signatures   Electronically signed by : Sanaz Sood, ; 2017 10:10AM EST                       (Author)

## 2018-01-14 VITALS
WEIGHT: 178 LBS | BODY MASS INDEX: 30.39 KG/M2 | DIASTOLIC BLOOD PRESSURE: 78 MMHG | SYSTOLIC BLOOD PRESSURE: 119 MMHG | HEIGHT: 64 IN

## 2018-01-14 VITALS
WEIGHT: 188.01 LBS | SYSTOLIC BLOOD PRESSURE: 132 MMHG | BODY MASS INDEX: 32.1 KG/M2 | DIASTOLIC BLOOD PRESSURE: 79 MMHG | HEIGHT: 64 IN

## 2018-01-14 NOTE — PROGRESS NOTES
2017         RE: Alvaro Parry                                   To: [facility]   MR#: [id number]                                   [facility]   : [address]: [id number]                             Fax: [phone]   (Exam #: [id number])      The LMP of this 31year old,  G2, P0-0-1-0 patient was OCT 7 2016, giving   her an ELISABETH of 2017 and a current gestational age of 16 weeks 6 days   by dates  A sonographic examination was performed on 2017 using real   time equipment  The ultrasound examination was performed using abdominal   technique  The patient has a BMI of 24 7  Her blood pressure today was   134/80  Earliest ultrasound found in her record: 16 9w0d 17 ELISABETH Multiple   longitudinal and transverse sections revealed a aguero intrauterine   pregnancy with the fetus in variable presentation  The placenta is   posterior in implantation  Cardiac motion was observed at 165 bpm       INDICATIONS      advanced maternal age      Exam Types      Level I      RESULTS      Fetus # 1 of 1   Variable presentation   Fetal growth appeared normal      MEASUREMENTS (* Included In Average GA)      CRL              6 6 cm        12 weeks 5 days*   Nuchal Trans    2 10 mm      THE AVERAGE GESTATIONAL AGE is 12 weeks 5 days +/- 7 days  ANATOMY COMMENTS      Anatomic detail is limited at this gestational age  The yolk sac was not   noted  The fetal cranium appeared normal in shape and the nuchal   translucency was normal in size at 2 1 mm  The nasal bone appears to be   present  The intracranial anatomy was unremarkable  Evaluation of the   spine revealed no obvious evidence for a neural tube defect  Anatomy of   the fetal thorax appeared within normal limits  The four-chamber and 3   vessel tracheal views are normal  The cardiac rhythm was regular    Within   the abdomen, stomach & bladder were visualized and the abdominal wall   appeared intact  A three vessel cord appears to be present  Active   movement of the fetal body & extremities was seen  There is no suspicion   of a subchorionic bleed  The placental cord insertion was normal    There   is no suspicion of a uterine myoma  Free fluid is not seen in the   posterior cul-de-sac  ADNEXA      The left ovary appeared normal and measured 3 0 x 2 3 x 2 2 cm with a   volume of 7 9 cc  The right ovary appeared normal and measured 2 0 x 2 0 x   1 9 cm with a volume of 4 0 cc       AMNIOTIC FLUID         Largest Vertical Pocket = 4 4 cm   Amniotic Fluid: Normal      IMPRESSION      [facility] IUP   12 weeks and 5 days by this ultrasound  (ELISABETH=JUL 15 2017)   Variable presentation   Fetal growth appeared normal   Regular fetal heart rate of 165 bpm   Posterior placenta      CONSULT COMMENT      Thank you very much for your kind referral of Ishmael Ryan to the [facility] in [facility] on 2017 for first trimester ultrasound   evaluation and  consult  Bill Reynolds is a 62-year-old  2 para   67 white female who is currently at 12-6/7 weeks gestation by an   estimated due date of 2017  She is referred for the indication of   advanced maternal age  Bill Reynolds will be 30years old at her estimated due   date  Her prenatal course so far has been unremarkable  Bill Reynolds has no   complaint  She denies vaginal bleeding      Bill Reynolds of history of one first trimester elective  in   She   has a history of several herniated discs, not requiring surgical   management  She has a history of a basal cell cancer involving her left   ear, followed by dermatology  Her past medical and surgical histories are   otherwise significant only for wisdom teeth removal  Her daily medications   include a prenatal vitamin and vitamin D supplementation  She has no known   drug allergy  She denies tobacco or illicit drug use during the pregnancy  The family genetic history is non-contributory  Today's ultrasound findings and suggested follow-up were discussed in   detail with Kehinde  Her risk for a live born baby with Down syndrome age   30 is one in 56  We discussed that definitive prenatal diagnosis is   possible only through genetic amniocentesis or chorionic villus sampling  We also discussed the option of non invasive prenatal testing using cell   free DNA analysis which has a 99% sensitivity for identification of Down   syndrome, though we also discussed that NIPT is limited to screening with   respect only to chromosomes 13, 18, 21, X and Y  Stephani Grandchild has decided to opt   for non invasive prenatal testing  Pending insurance approval, she will   have the study obtained within the next few days  Results will be   available about 10 days thereafter  Follow-up MSAFP testing is recommended   at 16-20 weeks gestation  Level II ultrasound evaluation will be performed   at about 20 weeks gestation  The face to face time, in addition to time spent discussing ultrasound   results, was approximately 15 minutes, greater than 50% of which was spent   during counseling and coordination of care  BRAD Carballo M D     Maternal-Fetal Medicine   Electronically signed 01/06/17 07:43

## 2018-01-17 NOTE — RESULT NOTES
Message   can you please let pt know that her bw was overall within a normal range? her tsh, cbc were normal  Her vit d was 33, low normal, I recommend continuing with the vit d supplementation  thank you     Verified Results  (1) VITAMIN D 25-HYDROXY [id number] 09:53AM Manisha Finn     Test Name Result Flag Reference   VIT D 25-HYDROX 33 0 ng/mL  30 0-100 0   This assay is a certified procedure of the CDC Vitamin D Standardization Certification Program (VDSCP)     Deficiency <20ng/ml   Insufficiency 20-30ng/ml   Sufficient  ng/ml     *Patients undergoing fluorescein dye angiography may retain small amounts of fluorescein in the body for 48-72 hours post procedure  Samples containing fluorescein can produce falsely elevated Vitamin D values  If the patient had this procedure, a specimen should be resubmitted post fluorescein clearance  (1) TSH WITH FT4 REFLEX [id number] 09:53AM Manisha Finn     Test Name Result Flag Reference   TSH 2 120 uIU/mL  0 358-3 740   Patients undergoing fluorescein dye angiography may retain small amounts of fluorescein in the body for 48-72 hours post procedure  Samples containing fluorescein can produce falsely depressed TSH values  If the patient had this procedure,a specimen should be resubmitted post fluorescein clearance  The recommended reference ranges for TSH during pregnancy are as follows:  First trimester 0 1 to 2 5 uIU/mL  Second trimester  0 2 to 3 0 uIU/mL  Third trimester 0 3 to 3 0 uIU/m     (1) CBC/PLT/DIFF [id number] 09:53AM Manisha Finn     Test Name Result Flag Reference   WBC COUNT 5 43 Thousand/uL  4 31-10 16   RBC COUNT 4 83 Million/uL  3 81-5 12   HEMOGLOBIN 14 3 g/dL  11 5-15 4   HEMATOCRIT 41 1 %  34 8-46  1   MCV 85 fL  82-98   MCH 29 6 pg  26 8-34 3   MCHC 34 8 g/dL  31 4-37 4   RDW 13 0 %  11 6-15 1   MPV 12 6 fL  8 9-12 7   PLATELET COUNT 383 Thousands/uL  149-390   nRBC AUTOMATED 0 /100 WBCs     NEUTROPHILS RELATIVE PERCENT 53 % 43-75   LYMPHOCYTES RELATIVE PERCENT 37 %  14-44   MONOCYTES RELATIVE PERCENT 8 %  4-12   EOSINOPHILS RELATIVE PERCENT 2 %  0-6   BASOPHILS RELATIVE PERCENT 0 %  0-1   NEUTROPHILS ABSOLUTE COUNT 2 86 Thousands/?L  1 85-7 62   LYMPHOCYTES ABSOLUTE COUNT 2 00 Thousands/?L  0 60-4 47   MONOCYTES ABSOLUTE COUNT 0 45 Thousand/?L  0 17-1 22   EOSINOPHILS ABSOLUTE COUNT 0 09 Thousand/?L  0 00-0 61   BASOPHILS ABSOLUTE COUNT 0 02 Thousands/?L  0 00-0 10

## 2018-01-22 VITALS
HEIGHT: 64 IN | DIASTOLIC BLOOD PRESSURE: 72 MMHG | BODY MASS INDEX: 30.22 KG/M2 | WEIGHT: 177 LBS | SYSTOLIC BLOOD PRESSURE: 112 MMHG

## 2018-01-22 VITALS
HEIGHT: 64 IN | DIASTOLIC BLOOD PRESSURE: 70 MMHG | BODY MASS INDEX: 24.5 KG/M2 | SYSTOLIC BLOOD PRESSURE: 114 MMHG | WEIGHT: 143.5 LBS

## 2018-01-22 VITALS
HEIGHT: 64 IN | DIASTOLIC BLOOD PRESSURE: 80 MMHG | SYSTOLIC BLOOD PRESSURE: 118 MMHG | BODY MASS INDEX: 28 KG/M2 | WEIGHT: 164 LBS

## 2018-01-22 VITALS
BODY MASS INDEX: 29.71 KG/M2 | HEIGHT: 64 IN | WEIGHT: 174 LBS | DIASTOLIC BLOOD PRESSURE: 84 MMHG | SYSTOLIC BLOOD PRESSURE: 120 MMHG

## 2018-01-22 VITALS
HEIGHT: 64 IN | BODY MASS INDEX: 31.58 KG/M2 | DIASTOLIC BLOOD PRESSURE: 80 MMHG | SYSTOLIC BLOOD PRESSURE: 132 MMHG | SYSTOLIC BLOOD PRESSURE: 120 MMHG | DIASTOLIC BLOOD PRESSURE: 70 MMHG | BODY MASS INDEX: 27.31 KG/M2 | WEIGHT: 160 LBS | HEIGHT: 64 IN | WEIGHT: 185 LBS

## 2018-01-22 VITALS
HEIGHT: 64 IN | WEIGHT: 158 LBS | SYSTOLIC BLOOD PRESSURE: 110 MMHG | DIASTOLIC BLOOD PRESSURE: 74 MMHG | BODY MASS INDEX: 26.98 KG/M2

## 2018-01-22 VITALS
WEIGHT: 179 LBS | DIASTOLIC BLOOD PRESSURE: 76 MMHG | SYSTOLIC BLOOD PRESSURE: 130 MMHG | BODY MASS INDEX: 30.56 KG/M2 | HEIGHT: 64 IN

## 2018-01-22 VITALS
DIASTOLIC BLOOD PRESSURE: 64 MMHG | HEIGHT: 64 IN | BODY MASS INDEX: 25.61 KG/M2 | SYSTOLIC BLOOD PRESSURE: 120 MMHG | WEIGHT: 150 LBS

## 2018-01-22 VITALS
SYSTOLIC BLOOD PRESSURE: 120 MMHG | HEIGHT: 64 IN | DIASTOLIC BLOOD PRESSURE: 80 MMHG | WEIGHT: 174.03 LBS | BODY MASS INDEX: 29.71 KG/M2

## 2018-01-22 VITALS
DIASTOLIC BLOOD PRESSURE: 70 MMHG | SYSTOLIC BLOOD PRESSURE: 110 MMHG | WEIGHT: 178 LBS | BODY MASS INDEX: 30.39 KG/M2 | HEIGHT: 64 IN

## 2018-01-22 VITALS
WEIGHT: 181 LBS | DIASTOLIC BLOOD PRESSURE: 70 MMHG | SYSTOLIC BLOOD PRESSURE: 110 MMHG | BODY MASS INDEX: 30.9 KG/M2 | HEIGHT: 64 IN

## 2018-01-22 VITALS
HEIGHT: 64 IN | SYSTOLIC BLOOD PRESSURE: 120 MMHG | BODY MASS INDEX: 28.68 KG/M2 | WEIGHT: 168 LBS | DIASTOLIC BLOOD PRESSURE: 80 MMHG

## 2018-01-22 VITALS
BODY MASS INDEX: 28.17 KG/M2 | WEIGHT: 165 LBS | SYSTOLIC BLOOD PRESSURE: 126 MMHG | DIASTOLIC BLOOD PRESSURE: 80 MMHG | HEIGHT: 64 IN

## 2018-01-22 VITALS
HEIGHT: 64 IN | WEIGHT: 185.5 LBS | SYSTOLIC BLOOD PRESSURE: 132 MMHG | BODY MASS INDEX: 31.67 KG/M2 | DIASTOLIC BLOOD PRESSURE: 84 MMHG

## 2018-01-23 VITALS
HEART RATE: 90 BPM | BODY MASS INDEX: 25 KG/M2 | DIASTOLIC BLOOD PRESSURE: 76 MMHG | WEIGHT: 146.4 LBS | SYSTOLIC BLOOD PRESSURE: 110 MMHG | HEIGHT: 64 IN | RESPIRATION RATE: 16 BRPM | OXYGEN SATURATION: 98 % | TEMPERATURE: 94.7 F

## 2018-02-23 ENCOUNTER — OFFICE VISIT (OUTPATIENT)
Dept: OBGYN CLINIC | Facility: CLINIC | Age: 38
End: 2018-02-23
Payer: COMMERCIAL

## 2018-02-23 VITALS
HEIGHT: 64 IN | WEIGHT: 147.6 LBS | SYSTOLIC BLOOD PRESSURE: 120 MMHG | DIASTOLIC BLOOD PRESSURE: 84 MMHG | BODY MASS INDEX: 25.2 KG/M2

## 2018-02-23 DIAGNOSIS — Z01.419 ENCOUNTER FOR ANNUAL ROUTINE GYNECOLOGICAL EXAMINATION: Primary | ICD-10-CM

## 2018-02-23 PROBLEM — Z98.891 S/P CESAREAN SECTION: Status: RESOLVED | Noted: 2017-07-16 | Resolved: 2018-02-23

## 2018-02-23 PROBLEM — Z3A.40 40 WEEKS GESTATION OF PREGNANCY: Status: RESOLVED | Noted: 2017-07-15 | Resolved: 2018-02-23

## 2018-02-23 PROCEDURE — G0145 SCR C/V CYTO,THINLAYER,RESCR: HCPCS | Performed by: OBSTETRICS & GYNECOLOGY

## 2018-02-23 PROCEDURE — 87624 HPV HI-RISK TYP POOLED RSLT: CPT | Performed by: OBSTETRICS & GYNECOLOGY

## 2018-02-23 PROCEDURE — 99395 PREV VISIT EST AGE 18-39: CPT | Performed by: OBSTETRICS & GYNECOLOGY

## 2018-02-23 RX ORDER — AZELAIC ACID 0.15 G/G
AEROSOL, FOAM TOPICAL
COMMUNITY
Start: 2018-02-21

## 2018-02-23 NOTE — PATIENT INSTRUCTIONS
The patient was informed of a stable gyn examination  A Pap smear was performed  She should return my office in 1 year

## 2018-02-23 NOTE — PROGRESS NOTES
This is a 51-year-old white female she is a  1 para 1 with 1  section approximately 7 months ago  She is still nursing  She denies any major gynecological  GI complaint  She denies any problem with her  scar  She plans on nursing for least a year  She is back to work  There is no problem with depression or anxiety  She is happy with her weight loss  Family history significant for high risk of colon malignancy on maternal side beginning at age 36  The patient is scheduled for screening colonoscopy soon  Medication list this is prenatal vitamins for nursing      Surgical history significant for  section 7 months ago      Social history is negative for tobacco social alcohol      Family history stated above positive for colon malignancy        Physical exam well-developed well-nourished white female acute distress heart and lung exam normal limits the breasts are symmetrical and equal in size  The breasts are consistent with nursing  Her abdomen is soft with no masses the  scar is healed well  Pelvic exam the external genitalia normal limits the vagina is clean the uterus is small nontender adnexa is clear a Pap smear was performed  Impression stable gyn examination  Pap smear was performed  The patient return to office in 1 year  She will consider other methods of contraception when she stops nursing

## 2018-02-25 ENCOUNTER — ANESTHESIA EVENT (OUTPATIENT)
Dept: GASTROENTEROLOGY | Facility: MEDICAL CENTER | Age: 38
End: 2018-02-25
Payer: COMMERCIAL

## 2018-02-26 ENCOUNTER — ANESTHESIA (OUTPATIENT)
Dept: GASTROENTEROLOGY | Facility: MEDICAL CENTER | Age: 38
End: 2018-02-26
Payer: COMMERCIAL

## 2018-02-26 ENCOUNTER — HOSPITAL ENCOUNTER (OUTPATIENT)
Facility: MEDICAL CENTER | Age: 38
Setting detail: OUTPATIENT SURGERY
Discharge: HOME | End: 2018-02-26
Attending: INTERNAL MEDICINE | Admitting: INTERNAL MEDICINE
Payer: COMMERCIAL

## 2018-02-26 VITALS
WEIGHT: 147 LBS | BODY MASS INDEX: 25.1 KG/M2 | OXYGEN SATURATION: 100 % | DIASTOLIC BLOOD PRESSURE: 55 MMHG | HEART RATE: 77 BPM | SYSTOLIC BLOOD PRESSURE: 104 MMHG | HEIGHT: 64 IN | TEMPERATURE: 97.8 F | RESPIRATION RATE: 16 BRPM

## 2018-02-26 DIAGNOSIS — Z80.0 FAMILY HISTORY OF MALIGNANT NEOPLASM OF DIGESTIVE ORGAN: ICD-10-CM

## 2018-02-26 DIAGNOSIS — K62.5 HEMORRHAGE OF ANUS AND RECTUM: ICD-10-CM

## 2018-02-26 LAB — EXT PREGNANCY TEST URINE: NEGATIVE

## 2018-02-26 PROCEDURE — 88305 TISSUE EXAM BY PATHOLOGIST: CPT | Performed by: PATHOLOGY

## 2018-02-26 PROCEDURE — 88305 TISSUE EXAM BY PATHOLOGIST: CPT | Performed by: INTERNAL MEDICINE

## 2018-02-26 PROCEDURE — 45380 COLONOSCOPY AND BIOPSY: CPT | Performed by: INTERNAL MEDICINE

## 2018-02-26 PROCEDURE — 81025 URINE PREGNANCY TEST: CPT | Performed by: ANESTHESIOLOGY

## 2018-02-26 RX ORDER — PROPOFOL 10 MG/ML
INJECTION, EMULSION INTRAVENOUS AS NEEDED
Status: DISCONTINUED | OUTPATIENT
Start: 2018-02-26 | End: 2018-02-26 | Stop reason: SURG

## 2018-02-26 RX ORDER — SODIUM CHLORIDE 9 MG/ML
125 INJECTION, SOLUTION INTRAVENOUS CONTINUOUS
Status: DISCONTINUED | OUTPATIENT
Start: 2018-02-26 | End: 2018-02-26 | Stop reason: HOSPADM

## 2018-02-26 RX ADMIN — PROPOFOL 100 MG: 10 INJECTION, EMULSION INTRAVENOUS at 13:30

## 2018-02-26 RX ADMIN — PROPOFOL 150 MG: 10 INJECTION, EMULSION INTRAVENOUS at 13:25

## 2018-02-26 RX ADMIN — PROPOFOL 50 MG: 10 INJECTION, EMULSION INTRAVENOUS at 13:41

## 2018-02-26 RX ADMIN — LIDOCAINE HYDROCHLORIDE 100 MG: 20 INJECTION, SOLUTION INTRAVENOUS at 13:25

## 2018-02-26 RX ADMIN — PROPOFOL 100 MG: 10 INJECTION, EMULSION INTRAVENOUS at 13:36

## 2018-02-26 RX ADMIN — SODIUM CHLORIDE 125 ML/HR: 0.9 INJECTION, SOLUTION INTRAVENOUS at 13:04

## 2018-02-26 NOTE — OP NOTE
**** GI/ENDOSCOPY REPORT ****     PATIENT NAME: COLE REYNA ------ VISIT ID:  Patient ID: [id number]   YOB: 1980     INTRODUCTION: Colonoscopy - A 30 female patient presents for an outpatient   Colonoscopy at [address] [place] [address]  PREVIOUS COLONOSCOPY: No prior colonoscopy  INDICATIONS: Screening for family history of colorectal cancer  CONSENT:  The benefits, risks, and alternatives to the procedure were   discussed and informed consent was obtained from the patient  PREPARATION: EKG, pulse, pulse oximetry and blood pressure were monitored   throughout the procedure  The patient was identified by myself both   verbally and by visual inspection of ID band  Airway Assessment   Classification: Airway class 2 - Visualization of the soft palate, fauces   and uvula  ASA Classification: See anesthesia record  MEDICATIONS: Anesthesia-check records     PROCEDURE:  The endoscope was passed without difficulty through the anus   under direct visualization and advanced to the cecum, confirmed by   appendiceal orifice and ileocecal valve  The scope was withdrawn and the   mucosa was carefully examined  The quality of the preparation was   excellent  Cecal Intubation Time: 1 minute(s) Scope Withdrawal Time: 9   minutes(s)     RECTAL EXAM: Diminutive external hemorrhoids were found  FINDINGS:  Diminutive internal hemorrhoids were found  A single polyp,   measuring 2 mm in size, was found in the rectum  The polyp was completely   removed by cold biopsy polypectomy  The polyp was retrieved  COMPLICATIONS: There were no complications  IMPRESSIONS: Diminutive external hemorrhoids, external were found during   the rectal exam  Internal hemorrhoids  A single polyp found in the rectum;   removed by cold biopsy polypectomy  RECOMMENDATIONS: Colonoscopy recommended in 5 years due to family hx and   colonic polyp       ESTIMATED BLOOD LOSS:     PATHOLOGY SPECIMENS: Completely removed by cold biopsy polypectomy  PROCEDURE CODES: Colonoscopy with biopsy     ICD-9 Codes: V16 0 Family history of malignant neoplasm of   gastrointestinal tract 455 3 External hemorrhoids without mention of   complication 203 8 Benign neoplasm of rectum and anal canal     ICD-10 Codes: Z80 0 Family history of malignant neoplasm of digestive   organs K64 9 Unspecified hemorrhoids K64 9 Unspecified hemorrhoids K63 5   Polyp of colon     PERFORMED BY: SARTHAK Rios  on 02/26/2018  Version 1, electronically signed by SARTHAK Girard  on 02/26/2018 at   13:50

## 2018-02-26 NOTE — DISCHARGE INSTRUCTIONS
Colonoscopy   WHAT YOU NEED TO KNOW:   A colonoscopy is a procedure to examine the inside of your colon (intestine) with a scope  Polyps or tissue growths may have been removed during your colonoscopy  It is normal to feel bloated and to have some abdominal discomfort  You should be passing gas  If you have hemorrhoids or you had polyps removed, you may have a small amount of bleeding  DISCHARGE INSTRUCTIONS:   Seek care immediately if:   · You have a large amount of bright red blood in your bowel movements  · Your abdomen is hard and firm and you have severe pain  · You have sudden trouble breathing  Contact your healthcare provider if:   · You develop a rash or hives  · You have a fever within 24 hours of your procedure  · You have not had a bowel movement for 3 days after your procedure  · You have questions or concerns about your condition or care  Activity:   · Do not lift, strain, or run  for 3 days after your procedure  · Rest after your procedure  You have been given medicine to relax you  Do not  drive or make important decisions until the day after your procedure  Return to your normal activity as directed  · Relieve gas and discomfort from bloating  by lying on your right side with a heating pad on your abdomen  You may need to take short walks to help the gas move out  Eat small meals until bloating is relieved  If you had polyps removed: For 7 days after your procedure:  · Do not  take aspirin  · Do not  go on long car rides  Help prevent constipation:   · Eat a variety of healthy foods  Healthy foods include fruit, vegetables, whole-grain breads, low-fat dairy products, beans, lean meat, and fish  Ask if you need to be on a special diet  Your healthcare provider may recommend that you eat high-fiber foods such as cooked beans  Fiber helps you have regular bowel movements  · Drink liquids as directed    Adults should drink between 9 and 13 eight-ounce cups of liquid every day  Ask what amount is best for you  For most people, good liquids to drink are water, juice, and milk  · Exercise as directed  Talk to your healthcare provider about the best exercise plan for you  Exercise can help prevent constipation, decrease your blood pressure and improve your health  Follow up with your healthcare provider as directed:  Write down your questions so you remember to ask them during your visits  © 2017 [employer] Information is for End User's use only and may not be sold, redistributed or otherwise used for commercial purposes  All illustrations and images included in CareNotes® are the copyrighted property of [employer]  or Jov[employer] Ramos  The above information is an  only  It is not intended as medical advice for individual conditions or treatments  Talk to your doctor, nurse or pharmacist before following any medical regimen to see if it is safe and effective for you

## 2018-02-26 NOTE — ANESTHESIA PREPROCEDURE EVALUATION
Review of Systems/Medical History  Patient summary reviewed  Chart reviewed  No history of anesthetic complications     Cardiovascular   Pulmonary  Negative pulmonary ROS        GI/Hepatic    Bowel prep       Negative  ROS        Endo/Other  Negative endo/other ROS      GYN  Negative gynecology ROS          Hematology  Negative hematology ROS      Musculoskeletal  Back pain , lumbar pain,        Neurology  Negative neurology ROS      Psychology       Comment: ADD         Physical Exam    Airway    Mallampati score: II  TM Distance: >3 FB  Neck ROM: full     Dental   No notable dental hx     Cardiovascular  Rhythm: regular, Rate: normal,     Pulmonary  Breath sounds clear to auscultation,     Other Findings        Anesthesia Plan  ASA Score- 2     Anesthesia Type- IV sedation with anesthesia with ASA Monitors  Additional Monitors:   Airway Plan:         Plan Factors- Patient instructed to abstain from smoking on day of procedure  Patient smoked on day of surgery  Induction- intravenous  Postoperative Plan-     Informed Consent- Anesthetic plan and risks discussed with patient

## 2018-02-28 LAB — HPV RRNA GENITAL QL NAA+PROBE: NORMAL

## 2018-03-01 LAB
LAB AP GYN PRIMARY INTERPRETATION: NORMAL
Lab: NORMAL

## 2018-03-07 NOTE — PROGRESS NOTES
Education  [email or web address] Education 3rd Trimester: Third Trimester Education provided:   benefits of breastfeeding, importance of exclusive breastfeeding, early initiation of breastfeeding, exclusive breastfeeding for the first 6 months, frequent feedings for optimal milk production, feeding on demand/baby-led feedings, effective positioning and attachment, importance of breastfeeding after 6 months following introduction of other foods, non-pharmacologic pain relief methods for labor and importance of early skin-to-skin contact  rooming-in on 24 hour basis Pregnancy Essentials Reference Guide given   The patient is planning on breastfeeding  The patient is planning on exclusively breastfeeding until the baby is 10months of age  Mother has registered for prenatal class  Thought has been given to selecting a pediatrician  The mother has not discussed personal preferences with her provider     Prenatal education provided by: Florencia Christianson      Signatures   Electronically signed by : Gerre Denver, DO; May  1 2017  8:13AM EST                       (Author)

## 2018-03-07 NOTE — PROGRESS NOTES
Education  [employer] Education 2nd Trimester:   Second Trimester Education provided:   benefits of breastfeeding, importance of exclusive breastfeeding, early initiation of breastfeeding, exclusive breastfeeding for the first 6 months, non-pharmacologic pain relief methods for labor, rooming-in on 24 hour basis and 28 week packet given  Mother has not registered for prenatal class  Thought has been given to selecting a pediatrician  The mother has not discussed personal preferences with her provider  Contraindication to breastfeeding identified: N/A   Yes   Prenatal education provided by: Maurilio Lara 93   Electronically signed by : Jordyn Brewer, ; Feb 28 2017  5:19PM EST                       (Author)

## 2023-02-27 ENCOUNTER — TELEPHONE (OUTPATIENT)
Dept: GASTROENTEROLOGY | Facility: CLINIC | Age: 43
End: 2023-02-27

## (undated) DEVICE — ABDOMINAL PAD: Brand: DERMACEA

## (undated) DEVICE — Device

## (undated) DEVICE — ADHESIVE SKN CLSR HISTOACRYL FLEX 0.5ML LF

## (undated) DEVICE — SUT VICRYL 0 CTX 36 IN J978H

## (undated) DEVICE — CHLORAPREP HI-LITE 26ML ORANGE

## (undated) DEVICE — SKIN MARKER DUAL TIP WITH RULER CAP, FLEXIBLE RULER AND LABELS: Brand: DEVON

## (undated) DEVICE — GAUZE SPONGES,16 PLY: Brand: CURITY

## (undated) DEVICE — GLOVE SRG BIOGEL 7

## (undated) DEVICE — TELFA NON-ADHERENT ABSORBENT DRESSING: Brand: TELFA

## (undated) DEVICE — PACK C-SECTION PBDS

## (undated) DEVICE — GLOVE SRG BIOGEL 7.5

## (undated) DEVICE — SUT MONOCRYL 4-0 PS-2 27 IN Y426H

## (undated) DEVICE — SUT VICRYL 0 CT-1 27 IN J260H